# Patient Record
Sex: FEMALE | Race: WHITE | Employment: UNEMPLOYED | URBAN - METROPOLITAN AREA
[De-identification: names, ages, dates, MRNs, and addresses within clinical notes are randomized per-mention and may not be internally consistent; named-entity substitution may affect disease eponyms.]

---

## 2017-12-20 ENCOUNTER — HOSPITAL ENCOUNTER (OUTPATIENT)
Dept: GENERAL RADIOLOGY | Age: 42
Discharge: HOME | End: 2017-12-20
Attending: INTERNAL MEDICINE | Admitting: INTERNAL MEDICINE

## 2017-12-20 DIAGNOSIS — J20.9 ACUTE BRONCHITIS, UNSPECIFIED ORGANISM: ICD-10-CM

## 2017-12-20 LAB
ALBUMIN SERPL-MCNC: 4.2 GM/DL (ref 3.4–5)
ALP BLD-CCNC: 138 IU/L (ref 40–128)
ALT SERPL-CCNC: 64 U/L (ref 10–40)
ANION GAP SERPL CALCULATED.3IONS-SCNC: 12 MMOL/L (ref 4–16)
AST SERPL-CCNC: 85 IU/L (ref 15–37)
BASOPHILS ABSOLUTE: 0.1 K/CU MM
BASOPHILS RELATIVE PERCENT: 0.6 % (ref 0–1)
BILIRUB SERPL-MCNC: 0.3 MG/DL (ref 0–1)
BUN BLDV-MCNC: 7 MG/DL (ref 6–23)
CALCIUM SERPL-MCNC: 9.9 MG/DL (ref 8.3–10.6)
CHLORIDE BLD-SCNC: 101 MMOL/L (ref 99–110)
CHOLESTEROL: 218 MG/DL
CO2: 25 MMOL/L (ref 21–32)
CREAT SERPL-MCNC: 0.7 MG/DL (ref 0.6–1.1)
DIFFERENTIAL TYPE: ABNORMAL
EOSINOPHILS ABSOLUTE: 0.2 K/CU MM
EOSINOPHILS RELATIVE PERCENT: 2.1 % (ref 0–3)
ESTIMATED AVERAGE GLUCOSE: 126 MG/DL
GFR AFRICAN AMERICAN: >60 ML/MIN/1.73M2
GFR NON-AFRICAN AMERICAN: >60 ML/MIN/1.73M2
GLUCOSE FASTING: 98 MG/DL (ref 70–99)
HBA1C MFR BLD: 6 % (ref 4.2–6.3)
HCT VFR BLD CALC: 42.2 % (ref 37–47)
HDLC SERPL-MCNC: 34 MG/DL
HEMOGLOBIN: 14.5 GM/DL (ref 12.5–16)
IMMATURE NEUTROPHIL %: 0.4 % (ref 0–0.43)
LDL CHOLESTEROL DIRECT: 157 MG/DL
LYMPHOCYTES ABSOLUTE: 2.5 K/CU MM
LYMPHOCYTES RELATIVE PERCENT: 24.5 % (ref 24–44)
MCH RBC QN AUTO: 32.8 PG (ref 27–31)
MCHC RBC AUTO-ENTMCNC: 34.4 % (ref 32–36)
MCV RBC AUTO: 95.5 FL (ref 78–100)
MONOCYTES ABSOLUTE: 0.7 K/CU MM
MONOCYTES RELATIVE PERCENT: 7 % (ref 0–4)
NUCLEATED RBC %: 0 %
PDW BLD-RTO: 12.7 % (ref 11.7–14.9)
PLATELET # BLD: 265 K/CU MM (ref 140–440)
PMV BLD AUTO: 11.1 FL (ref 7.5–11.1)
POTASSIUM SERPL-SCNC: 4.3 MMOL/L (ref 3.5–5.1)
RBC # BLD: 4.42 M/CU MM (ref 4.2–5.4)
SEGMENTED NEUTROPHILS ABSOLUTE COUNT: 6.6 K/CU MM
SEGMENTED NEUTROPHILS RELATIVE PERCENT: 65.4 % (ref 36–66)
SODIUM BLD-SCNC: 138 MMOL/L (ref 135–145)
T4 FREE: 1.01 NG/DL (ref 0.9–1.8)
TOTAL IMMATURE NEUTOROPHIL: 0.04 K/CU MM
TOTAL NUCLEATED RBC: 0 K/CU MM
TOTAL PROTEIN: 7.3 GM/DL (ref 6.4–8.2)
TRIGL SERPL-MCNC: 234 MG/DL
TSH HIGH SENSITIVITY: 2.1 UIU/ML (ref 0.27–4.2)
WBC # BLD: 10 K/CU MM (ref 4–10.5)

## 2019-10-31 ENCOUNTER — HOSPITAL ENCOUNTER (EMERGENCY)
Age: 44
Discharge: HOME | End: 2019-10-31
Attending: EMERGENCY MEDICINE
Payer: COMMERCIAL

## 2019-10-31 ENCOUNTER — APPOINTMENT (OUTPATIENT)
Dept: CT IMAGING | Age: 44
End: 2019-10-31
Payer: COMMERCIAL

## 2019-10-31 ENCOUNTER — APPOINTMENT (OUTPATIENT)
Dept: GENERAL RADIOLOGY | Age: 44
End: 2019-10-31
Payer: COMMERCIAL

## 2019-10-31 VITALS
DIASTOLIC BLOOD PRESSURE: 93 MMHG | OXYGEN SATURATION: 97 % | SYSTOLIC BLOOD PRESSURE: 172 MMHG | RESPIRATION RATE: 20 BRPM | HEIGHT: 67 IN | BODY MASS INDEX: 38.45 KG/M2 | WEIGHT: 245 LBS | HEART RATE: 91 BPM | TEMPERATURE: 98.2 F

## 2019-10-31 DIAGNOSIS — M54.6 ACUTE BILATERAL THORACIC BACK PAIN: Primary | ICD-10-CM

## 2019-10-31 LAB
ALBUMIN SERPL-MCNC: 4.1 GM/DL (ref 3.4–5)
ALP BLD-CCNC: 181 IU/L (ref 40–129)
ALT SERPL-CCNC: 60 U/L (ref 10–40)
ANION GAP SERPL CALCULATED.3IONS-SCNC: 11 MMOL/L (ref 4–16)
AST SERPL-CCNC: 113 IU/L (ref 15–37)
BACTERIA: NEGATIVE /HPF
BASOPHILS ABSOLUTE: 0.1 K/CU MM
BASOPHILS RELATIVE PERCENT: 0.5 % (ref 0–1)
BILIRUB SERPL-MCNC: 0.3 MG/DL (ref 0–1)
BILIRUBIN URINE: NEGATIVE MG/DL
BLOOD, URINE: ABNORMAL
BUN BLDV-MCNC: 9 MG/DL (ref 6–23)
CALCIUM OXALATE CRYSTALS: ABNORMAL /HPF
CALCIUM SERPL-MCNC: 10.4 MG/DL (ref 8.3–10.6)
CHLORIDE BLD-SCNC: 102 MMOL/L (ref 99–110)
CLARITY: ABNORMAL
CO2: 24 MMOL/L (ref 21–32)
COLOR: YELLOW
CREAT SERPL-MCNC: 0.7 MG/DL (ref 0.6–1.1)
D DIMER: 213 NG/ML(DDU)
DIFFERENTIAL TYPE: ABNORMAL
EOSINOPHILS ABSOLUTE: 0.2 K/CU MM
EOSINOPHILS RELATIVE PERCENT: 1.9 % (ref 0–3)
GFR AFRICAN AMERICAN: >60 ML/MIN/1.73M2
GFR NON-AFRICAN AMERICAN: >60 ML/MIN/1.73M2
GLUCOSE BLD-MCNC: 117 MG/DL (ref 70–99)
GLUCOSE, URINE: NEGATIVE MG/DL
HCG QUALITATIVE: NEGATIVE
HCT VFR BLD CALC: 42.3 % (ref 37–47)
HEMOGLOBIN: 13.7 GM/DL (ref 12.5–16)
IMMATURE NEUTROPHIL %: 0.5 % (ref 0–0.43)
KETONES, URINE: NEGATIVE MG/DL
LEUKOCYTE ESTERASE, URINE: ABNORMAL
LIPASE: 45 IU/L (ref 13–60)
LYMPHOCYTES ABSOLUTE: 2.8 K/CU MM
LYMPHOCYTES RELATIVE PERCENT: 22.2 % (ref 24–44)
MCH RBC QN AUTO: 31.2 PG (ref 27–31)
MCHC RBC AUTO-ENTMCNC: 32.4 % (ref 32–36)
MCV RBC AUTO: 96.4 FL (ref 78–100)
MONOCYTES ABSOLUTE: 0.8 K/CU MM
MONOCYTES RELATIVE PERCENT: 6.1 % (ref 0–4)
MUCUS: ABNORMAL HPF
NITRITE URINE, QUANTITATIVE: NEGATIVE
NUCLEATED RBC %: 0 %
PDW BLD-RTO: 12.7 % (ref 11.7–14.9)
PH, URINE: 5 (ref 5–8)
PLATELET # BLD: 327 K/CU MM (ref 140–440)
PMV BLD AUTO: 11.2 FL (ref 7.5–11.1)
POTASSIUM SERPL-SCNC: 3.7 MMOL/L (ref 3.5–5.1)
PROTEIN UA: 100 MG/DL
RBC # BLD: 4.39 M/CU MM (ref 4.2–5.4)
RBC URINE: 126 /HPF (ref 0–6)
SEGMENTED NEUTROPHILS ABSOLUTE COUNT: 8.7 K/CU MM
SEGMENTED NEUTROPHILS RELATIVE PERCENT: 68.8 % (ref 36–66)
SODIUM BLD-SCNC: 137 MMOL/L (ref 135–145)
SPECIFIC GRAVITY UA: 1.02 (ref 1–1.03)
SQUAMOUS EPITHELIAL: 2 /HPF
TOTAL IMMATURE NEUTOROPHIL: 0.06 K/CU MM
TOTAL NUCLEATED RBC: 0 K/CU MM
TOTAL PROTEIN: 8 GM/DL (ref 6.4–8.2)
TRICHOMONAS: ABNORMAL /HPF
UROBILINOGEN, URINE: NORMAL MG/DL (ref 0.2–1)
WBC # BLD: 12.6 K/CU MM (ref 4–10.5)
WBC UA: 27 /HPF (ref 0–5)

## 2019-10-31 PROCEDURE — 81001 URINALYSIS AUTO W/SCOPE: CPT

## 2019-10-31 PROCEDURE — 6370000000 HC RX 637 (ALT 250 FOR IP): Performed by: EMERGENCY MEDICINE

## 2019-10-31 PROCEDURE — 87086 URINE CULTURE/COLONY COUNT: CPT

## 2019-10-31 PROCEDURE — 80053 COMPREHEN METABOLIC PANEL: CPT

## 2019-10-31 PROCEDURE — 84703 CHORIONIC GONADOTROPIN ASSAY: CPT

## 2019-10-31 PROCEDURE — 74176 CT ABD & PELVIS W/O CONTRAST: CPT

## 2019-10-31 PROCEDURE — 85379 FIBRIN DEGRADATION QUANT: CPT

## 2019-10-31 PROCEDURE — 85025 COMPLETE CBC W/AUTO DIFF WBC: CPT

## 2019-10-31 PROCEDURE — 83690 ASSAY OF LIPASE: CPT

## 2019-10-31 PROCEDURE — 99284 EMERGENCY DEPT VISIT MOD MDM: CPT

## 2019-10-31 PROCEDURE — 93010 ELECTROCARDIOGRAM REPORT: CPT | Performed by: INTERNAL MEDICINE

## 2019-10-31 PROCEDURE — 93005 ELECTROCARDIOGRAM TRACING: CPT | Performed by: EMERGENCY MEDICINE

## 2019-10-31 PROCEDURE — 71046 X-RAY EXAM CHEST 2 VIEWS: CPT

## 2019-10-31 RX ORDER — LIDOCAINE 4 G/G
2 PATCH TOPICAL DAILY
Status: DISCONTINUED | OUTPATIENT
Start: 2019-10-31 | End: 2019-10-31 | Stop reason: HOSPADM

## 2019-10-31 RX ORDER — LIDOCAINE 50 MG/G
1 PATCH TOPICAL DAILY
Qty: 30 PATCH | Refills: 0 | Status: SHIPPED | OUTPATIENT
Start: 2019-10-31 | End: 2021-02-12

## 2019-10-31 RX ORDER — HYDROCODONE BITARTRATE AND ACETAMINOPHEN 5; 325 MG/1; MG/1
1 TABLET ORAL ONCE
Status: COMPLETED | OUTPATIENT
Start: 2019-10-31 | End: 2019-10-31

## 2019-10-31 RX ADMIN — HYDROCODONE BITARTRATE AND ACETAMINOPHEN 1 TABLET: 5; 325 TABLET ORAL at 09:46

## 2019-10-31 ASSESSMENT — PAIN SCALES - GENERAL
PAINLEVEL_OUTOF10: 10
PAINLEVEL_OUTOF10: 10

## 2019-10-31 ASSESSMENT — PAIN DESCRIPTION - LOCATION: LOCATION: BACK

## 2019-10-31 ASSESSMENT — PAIN DESCRIPTION - PAIN TYPE: TYPE: ACUTE PAIN

## 2019-10-31 ASSESSMENT — PAIN DESCRIPTION - ORIENTATION: ORIENTATION: MID

## 2019-11-01 LAB
CULTURE: ABNORMAL
Lab: ABNORMAL
SPECIMEN: ABNORMAL
TOTAL COLONY COUNT: ABNORMAL

## 2019-11-07 LAB
EKG ATRIAL RATE: 85 BPM
EKG DIAGNOSIS: NORMAL
EKG P AXIS: 42 DEGREES
EKG P-R INTERVAL: 160 MS
EKG Q-T INTERVAL: 392 MS
EKG QRS DURATION: 90 MS
EKG QTC CALCULATION (BAZETT): 466 MS
EKG R AXIS: 16 DEGREES
EKG T AXIS: 53 DEGREES
EKG VENTRICULAR RATE: 85 BPM

## 2019-12-14 ENCOUNTER — HOSPITAL ENCOUNTER (EMERGENCY)
Age: 44
Discharge: HOME | End: 2019-12-14
Attending: EMERGENCY MEDICINE
Payer: COMMERCIAL

## 2019-12-14 ENCOUNTER — APPOINTMENT (OUTPATIENT)
Dept: GENERAL RADIOLOGY | Age: 44
End: 2019-12-14
Payer: COMMERCIAL

## 2019-12-14 VITALS
WEIGHT: 250 LBS | SYSTOLIC BLOOD PRESSURE: 160 MMHG | DIASTOLIC BLOOD PRESSURE: 71 MMHG | BODY MASS INDEX: 37.03 KG/M2 | HEIGHT: 69 IN | TEMPERATURE: 97.7 F | OXYGEN SATURATION: 96 % | RESPIRATION RATE: 16 BRPM | HEART RATE: 89 BPM

## 2019-12-14 DIAGNOSIS — R00.2 PALPITATIONS: ICD-10-CM

## 2019-12-14 DIAGNOSIS — H66.002 ACUTE SUPPURATIVE OTITIS MEDIA OF LEFT EAR WITHOUT SPONTANEOUS RUPTURE OF TYMPANIC MEMBRANE, RECURRENCE NOT SPECIFIED: ICD-10-CM

## 2019-12-14 DIAGNOSIS — J02.9 ACUTE PHARYNGITIS, UNSPECIFIED ETIOLOGY: ICD-10-CM

## 2019-12-14 DIAGNOSIS — R07.9 CHEST PAIN, UNSPECIFIED TYPE: ICD-10-CM

## 2019-12-14 DIAGNOSIS — J06.9 UPPER RESPIRATORY TRACT INFECTION, UNSPECIFIED TYPE: Primary | ICD-10-CM

## 2019-12-14 LAB
ALBUMIN SERPL-MCNC: 3.9 GM/DL (ref 3.4–5)
ALP BLD-CCNC: 184 IU/L (ref 40–129)
ALT SERPL-CCNC: 51 U/L (ref 10–40)
ANION GAP SERPL CALCULATED.3IONS-SCNC: 14 MMOL/L (ref 4–16)
AST SERPL-CCNC: 99 IU/L (ref 15–37)
BASOPHILS ABSOLUTE: 0.1 K/CU MM
BASOPHILS RELATIVE PERCENT: 0.5 % (ref 0–1)
BILIRUB SERPL-MCNC: 0.4 MG/DL (ref 0–1)
BUN BLDV-MCNC: 8 MG/DL (ref 6–23)
CALCIUM SERPL-MCNC: 10.4 MG/DL (ref 8.3–10.6)
CHLORIDE BLD-SCNC: 102 MMOL/L (ref 99–110)
CO2: 21 MMOL/L (ref 21–32)
CREAT SERPL-MCNC: 0.6 MG/DL (ref 0.6–1.1)
DIFFERENTIAL TYPE: ABNORMAL
EOSINOPHILS ABSOLUTE: 0.3 K/CU MM
EOSINOPHILS RELATIVE PERCENT: 2.3 % (ref 0–3)
GFR AFRICAN AMERICAN: >60 ML/MIN/1.73M2
GFR NON-AFRICAN AMERICAN: >60 ML/MIN/1.73M2
GLUCOSE BLD-MCNC: 217 MG/DL (ref 70–99)
HCT VFR BLD CALC: 45.6 % (ref 37–47)
HEMOGLOBIN: 14.4 GM/DL (ref 12.5–16)
IMMATURE NEUTROPHIL %: 0.5 % (ref 0–0.43)
LYMPHOCYTES ABSOLUTE: 2.7 K/CU MM
LYMPHOCYTES RELATIVE PERCENT: 23 % (ref 24–44)
MCH RBC QN AUTO: 30.9 PG (ref 27–31)
MCHC RBC AUTO-ENTMCNC: 31.6 % (ref 32–36)
MCV RBC AUTO: 97.9 FL (ref 78–100)
MONOCYTES ABSOLUTE: 0.6 K/CU MM
MONOCYTES RELATIVE PERCENT: 5.1 % (ref 0–4)
NUCLEATED RBC %: 0 %
PDW BLD-RTO: 13.1 % (ref 11.7–14.9)
PLATELET # BLD: 305 K/CU MM (ref 140–440)
PMV BLD AUTO: 10.9 FL (ref 7.5–11.1)
POTASSIUM SERPL-SCNC: 3.8 MMOL/L (ref 3.5–5.1)
PRO-BNP: 97.09 PG/ML
RBC # BLD: 4.66 M/CU MM (ref 4.2–5.4)
SEGMENTED NEUTROPHILS ABSOLUTE COUNT: 8.2 K/CU MM
SEGMENTED NEUTROPHILS RELATIVE PERCENT: 68.6 % (ref 36–66)
SODIUM BLD-SCNC: 137 MMOL/L (ref 135–145)
TOTAL IMMATURE NEUTOROPHIL: 0.06 K/CU MM
TOTAL NUCLEATED RBC: 0 K/CU MM
TOTAL PROTEIN: 8.1 GM/DL (ref 6.4–8.2)
TROPONIN T: <0.01 NG/ML
WBC # BLD: 11.9 K/CU MM (ref 4–10.5)

## 2019-12-14 PROCEDURE — 83880 ASSAY OF NATRIURETIC PEPTIDE: CPT

## 2019-12-14 PROCEDURE — 85025 COMPLETE CBC W/AUTO DIFF WBC: CPT

## 2019-12-14 PROCEDURE — 80053 COMPREHEN METABOLIC PANEL: CPT

## 2019-12-14 PROCEDURE — 36415 COLL VENOUS BLD VENIPUNCTURE: CPT

## 2019-12-14 PROCEDURE — 84484 ASSAY OF TROPONIN QUANT: CPT

## 2019-12-14 PROCEDURE — 71045 X-RAY EXAM CHEST 1 VIEW: CPT

## 2019-12-14 PROCEDURE — 99285 EMERGENCY DEPT VISIT HI MDM: CPT

## 2019-12-14 PROCEDURE — 93005 ELECTROCARDIOGRAM TRACING: CPT | Performed by: EMERGENCY MEDICINE

## 2019-12-14 RX ORDER — ALBUTEROL SULFATE 90 UG/1
2 AEROSOL, METERED RESPIRATORY (INHALATION) EVERY 4 HOURS PRN
Qty: 1 INHALER | Refills: 1 | Status: SHIPPED | OUTPATIENT
Start: 2019-12-14 | End: 2020-12-23

## 2019-12-14 RX ORDER — GUAIFENESIN/DEXTROMETHORPHAN 100-10MG/5
5 SYRUP ORAL 3 TIMES DAILY PRN
Qty: 120 ML | Refills: 0 | Status: SHIPPED | OUTPATIENT
Start: 2019-12-14 | End: 2019-12-24

## 2019-12-14 RX ORDER — AZITHROMYCIN 250 MG/1
TABLET, FILM COATED ORAL
Qty: 1 PACKET | Refills: 0 | Status: SHIPPED | OUTPATIENT
Start: 2019-12-14 | End: 2019-12-18

## 2019-12-14 RX ORDER — BENZONATATE 100 MG/1
100 CAPSULE ORAL 3 TIMES DAILY PRN
Qty: 30 CAPSULE | Refills: 0 | Status: SHIPPED | OUTPATIENT
Start: 2019-12-14 | End: 2019-12-24

## 2019-12-15 PROCEDURE — 93010 ELECTROCARDIOGRAM REPORT: CPT | Performed by: INTERNAL MEDICINE

## 2019-12-19 LAB
EKG ATRIAL RATE: 90 BPM
EKG DIAGNOSIS: NORMAL
EKG P AXIS: 55 DEGREES
EKG P-R INTERVAL: 206 MS
EKG Q-T INTERVAL: 368 MS
EKG QRS DURATION: 96 MS
EKG QTC CALCULATION (BAZETT): 450 MS
EKG R AXIS: 30 DEGREES
EKG T AXIS: 58 DEGREES
EKG VENTRICULAR RATE: 90 BPM

## 2020-03-21 ENCOUNTER — HOSPITAL ENCOUNTER (EMERGENCY)
Age: 45
Discharge: HOME | End: 2020-03-21
Attending: EMERGENCY MEDICINE
Payer: COMMERCIAL

## 2020-03-21 VITALS
DIASTOLIC BLOOD PRESSURE: 109 MMHG | OXYGEN SATURATION: 96 % | BODY MASS INDEX: 36.29 KG/M2 | TEMPERATURE: 98.2 F | HEART RATE: 100 BPM | SYSTOLIC BLOOD PRESSURE: 190 MMHG | HEIGHT: 69 IN | WEIGHT: 245 LBS

## 2020-03-21 PROCEDURE — 4500000027

## 2020-03-21 PROCEDURE — 2500000003 HC RX 250 WO HCPCS: Performed by: EMERGENCY MEDICINE

## 2020-03-21 PROCEDURE — 99282 EMERGENCY DEPT VISIT SF MDM: CPT

## 2020-03-21 PROCEDURE — 6370000000 HC RX 637 (ALT 250 FOR IP): Performed by: EMERGENCY MEDICINE

## 2020-03-21 RX ORDER — POLYETHYLENE GLYCOL 3350 17 G/17G
17 POWDER, FOR SOLUTION ORAL DAILY PRN
Qty: 527 G | Refills: 1 | Status: SHIPPED | OUTPATIENT
Start: 2020-03-21 | End: 2020-04-20

## 2020-03-21 RX ORDER — ONDANSETRON 4 MG/1
4 TABLET, ORALLY DISINTEGRATING ORAL EVERY 8 HOURS PRN
Qty: 15 TABLET | Refills: 0 | Status: SHIPPED | OUTPATIENT
Start: 2020-03-21 | End: 2020-09-12 | Stop reason: ALTCHOICE

## 2020-03-21 RX ORDER — NAPROXEN 500 MG/1
500 TABLET ORAL 2 TIMES DAILY
Qty: 60 TABLET | Refills: 0 | OUTPATIENT
Start: 2020-03-21 | End: 2020-12-23

## 2020-03-21 RX ORDER — AMOXICILLIN AND CLAVULANATE POTASSIUM 875; 125 MG/1; MG/1
1 TABLET, FILM COATED ORAL ONCE
Status: COMPLETED | OUTPATIENT
Start: 2020-03-21 | End: 2020-03-21

## 2020-03-21 RX ORDER — NAPROXEN 250 MG/1
500 TABLET ORAL ONCE
Status: COMPLETED | OUTPATIENT
Start: 2020-03-21 | End: 2020-03-21

## 2020-03-21 RX ORDER — LIDOCAINE HYDROCHLORIDE 10 MG/ML
10 INJECTION, SOLUTION INFILTRATION; PERINEURAL ONCE
Status: COMPLETED | OUTPATIENT
Start: 2020-03-21 | End: 2020-03-21

## 2020-03-21 RX ORDER — HYDROCODONE BITARTRATE AND ACETAMINOPHEN 5; 325 MG/1; MG/1
1 TABLET ORAL ONCE
Status: COMPLETED | OUTPATIENT
Start: 2020-03-21 | End: 2020-03-21

## 2020-03-21 RX ORDER — AMOXICILLIN AND CLAVULANATE POTASSIUM 875; 125 MG/1; MG/1
1 TABLET, FILM COATED ORAL 2 TIMES DAILY
Qty: 20 TABLET | Refills: 0 | Status: SHIPPED | OUTPATIENT
Start: 2020-03-21 | End: 2020-03-31

## 2020-03-21 RX ORDER — ONDANSETRON 4 MG/1
4 TABLET, ORALLY DISINTEGRATING ORAL ONCE
Status: COMPLETED | OUTPATIENT
Start: 2020-03-21 | End: 2020-03-21

## 2020-03-21 RX ORDER — HYDROCODONE BITARTRATE AND ACETAMINOPHEN 5; 325 MG/1; MG/1
1-2 TABLET ORAL EVERY 6 HOURS PRN
Qty: 10 TABLET | Refills: 0 | Status: SHIPPED | OUTPATIENT
Start: 2020-03-21 | End: 2020-03-23

## 2020-03-21 RX ADMIN — LIDOCAINE HYDROCHLORIDE 10 ML: 10 INJECTION, SOLUTION INFILTRATION; PERINEURAL at 10:30

## 2020-03-21 RX ADMIN — NAPROXEN 500 MG: 250 TABLET ORAL at 09:57

## 2020-03-21 RX ADMIN — AMOXICILLIN AND CLAVULANATE POTASSIUM 1 TABLET: 875; 125 TABLET, FILM COATED ORAL at 09:57

## 2020-03-21 RX ADMIN — HYDROCODONE BITARTRATE AND ACETAMINOPHEN 1 TABLET: 5; 325 TABLET ORAL at 09:58

## 2020-03-21 RX ADMIN — ONDANSETRON 4 MG: 4 TABLET, ORALLY DISINTEGRATING ORAL at 09:57

## 2020-03-21 ASSESSMENT — PAIN DESCRIPTION - PROGRESSION: CLINICAL_PROGRESSION: NOT CHANGED

## 2020-03-21 ASSESSMENT — PAIN DESCRIPTION - ONSET: ONSET: GRADUAL

## 2020-03-21 ASSESSMENT — PAIN SCALES - GENERAL
PAINLEVEL_OUTOF10: 5
PAINLEVEL_OUTOF10: 10

## 2020-03-21 ASSESSMENT — PAIN DESCRIPTION - LOCATION: LOCATION: TEETH

## 2020-03-21 ASSESSMENT — PAIN DESCRIPTION - FREQUENCY: FREQUENCY: CONTINUOUS

## 2020-03-21 ASSESSMENT — PAIN DESCRIPTION - PAIN TYPE: TYPE: ACUTE PAIN;CHRONIC PAIN

## 2020-03-21 NOTE — ED PROVIDER NOTES
Triage Chief Complaint:   Dental Pain    [place]:  Susanna Wise is a 40 y.o. female that presents with complaint of right-sided dental pain. Patient states pain started last night she has been taking Tylenol no relief. Denies any headache fevers nausea vomiting chest pain shortness of breath. Has not seen a dentist due to current conditions and then being shut down. Denies other questions or concerns feel like there is some swelling there.     ROS:  General:  No fevers  Eyes:  no discharge  ENT:  No sore throat, no nasal congestion, no hearing changes, + dental pain  Cardiovascular:  No chest pain  Respiratory:  no cough  Gastrointestinal:  no nausea, no vomiting  Musculoskeletal:   no joint pain  Skin:  No rash  Neurologic:  No speech problems, no headache  Genitourinary:  No dysuria    Past Medical History:   Diagnosis Date    Asthma     last flare up 11/2016    Migraine     Last migraine 12/2015    Ovarian cyst     Rt side    Wears glasses      Past Surgical History:   Procedure Laterality Date   [address] [facility], 2000, 2006( with BPS)    x 5    HERNIA REPAIR      OTHER SURGICAL HISTORY  12 29 2015    robotic assist lap ventral hernia repair with mesh graft    UMBILICAL HERNIA REPAIR  03/31/15    laparoscopic with mesh    UMBILICAL HERNIA REPAIR  01/23/2017    Robotic    VENTRAL HERNIA REPAIR  01/23/2017    Robotic    WISDOM TOOTH EXTRACTION      3 of 4 extracted     Family History   Problem Relation Age of Onset    Diabetes Mother     Other Mother         thyroid    High Blood Pressure Mother     COPD Mother     Kidney Disease Mother     High Blood Pressure Sister     Emphysema Sister     High Blood Pressure Sister     Early Death Son     Hearing Loss Daughter      Social History     Socioeconomic History    Marital status: Legally      Spouse name: Not on file    Number of children: Not on file    Years of education: Not on file    Highest education level: DO [place]  03/21/20 1050

## 2020-09-12 ENCOUNTER — APPOINTMENT (OUTPATIENT)
Dept: CT IMAGING | Age: 45
End: 2020-09-12
Payer: COMMERCIAL

## 2020-09-12 ENCOUNTER — HOSPITAL ENCOUNTER (EMERGENCY)
Age: 45
Discharge: HOME | End: 2020-09-12
Attending: EMERGENCY MEDICINE
Payer: COMMERCIAL

## 2020-09-12 VITALS
WEIGHT: 243 LBS | TEMPERATURE: 98.1 F | SYSTOLIC BLOOD PRESSURE: 161 MMHG | BODY MASS INDEX: 35.99 KG/M2 | HEIGHT: 69 IN | HEART RATE: 78 BPM | OXYGEN SATURATION: 97 % | RESPIRATION RATE: 17 BRPM | DIASTOLIC BLOOD PRESSURE: 96 MMHG

## 2020-09-12 LAB
ALBUMIN SERPL-MCNC: 4.3 GM/DL (ref 3.4–5)
ALP BLD-CCNC: 160 IU/L (ref 40–129)
ALT SERPL-CCNC: 21 U/L (ref 10–40)
AMORPHOUS: ABNORMAL /HPF
ANION GAP SERPL CALCULATED.3IONS-SCNC: 12 MMOL/L (ref 4–16)
AST SERPL-CCNC: 23 IU/L (ref 15–37)
BACTERIA: NEGATIVE /HPF
BASOPHILS ABSOLUTE: 0.1 K/CU MM
BASOPHILS RELATIVE PERCENT: 0.5 % (ref 0–1)
BILIRUB SERPL-MCNC: 0.4 MG/DL (ref 0–1)
BILIRUBIN URINE: NEGATIVE MG/DL
BLOOD, URINE: NEGATIVE
BUN BLDV-MCNC: 11 MG/DL (ref 6–23)
CALCIUM SERPL-MCNC: 10.3 MG/DL (ref 8.3–10.6)
CHLORIDE BLD-SCNC: 97 MMOL/L (ref 99–110)
CLARITY: ABNORMAL
CO2: 23 MMOL/L (ref 21–32)
COLOR: YELLOW
CREAT SERPL-MCNC: 0.9 MG/DL (ref 0.6–1.1)
DIFFERENTIAL TYPE: ABNORMAL
EOSINOPHILS ABSOLUTE: 0.1 K/CU MM
EOSINOPHILS RELATIVE PERCENT: 0.8 % (ref 0–3)
GFR AFRICAN AMERICAN: >60 ML/MIN/1.73M2
GFR NON-AFRICAN AMERICAN: >60 ML/MIN/1.73M2
GLUCOSE BLD-MCNC: 137 MG/DL (ref 70–99)
GLUCOSE, URINE: NEGATIVE MG/DL
HCG QUALITATIVE: NEGATIVE
HCT VFR BLD CALC: 42.3 % (ref 37–47)
HEMOGLOBIN: 13.7 GM/DL (ref 12.5–16)
IMMATURE NEUTROPHIL %: 0.3 % (ref 0–0.43)
KETONES, URINE: NEGATIVE MG/DL
LEUKOCYTE ESTERASE, URINE: NEGATIVE
LYMPHOCYTES ABSOLUTE: 2.5 K/CU MM
LYMPHOCYTES RELATIVE PERCENT: 18.9 % (ref 24–44)
MCH RBC QN AUTO: 30.6 PG (ref 27–31)
MCHC RBC AUTO-ENTMCNC: 32.4 % (ref 32–36)
MCV RBC AUTO: 94.6 FL (ref 78–100)
MONOCYTES ABSOLUTE: 0.6 K/CU MM
MONOCYTES RELATIVE PERCENT: 4.6 % (ref 0–4)
MUCUS: ABNORMAL HPF
NITRITE URINE, QUANTITATIVE: NEGATIVE
NUCLEATED RBC %: 0 %
PDW BLD-RTO: 12.5 % (ref 11.7–14.9)
PH, URINE: 6 (ref 5–8)
PLATELET # BLD: 291 K/CU MM (ref 140–440)
PMV BLD AUTO: 10.4 FL (ref 7.5–11.1)
POTASSIUM SERPL-SCNC: 4.3 MMOL/L (ref 3.5–5.1)
PROTEIN UA: 30 MG/DL
RBC # BLD: 4.47 M/CU MM (ref 4.2–5.4)
RBC URINE: <1 /HPF (ref 0–6)
SEGMENTED NEUTROPHILS ABSOLUTE COUNT: 9.8 K/CU MM
SEGMENTED NEUTROPHILS RELATIVE PERCENT: 74.9 % (ref 36–66)
SODIUM BLD-SCNC: 132 MMOL/L (ref 135–145)
SPECIFIC GRAVITY UA: 1.02 (ref 1–1.03)
SQUAMOUS EPITHELIAL: 7 /HPF
TOTAL IMMATURE NEUTOROPHIL: 0.04 K/CU MM
TOTAL NUCLEATED RBC: 0 K/CU MM
TOTAL PROTEIN: 7.8 GM/DL (ref 6.4–8.2)
TRICHOMONAS: ABNORMAL /HPF
UROBILINOGEN, URINE: NORMAL MG/DL (ref 0.2–1)
WBC # BLD: 13 K/CU MM (ref 4–10.5)
WBC UA: 1 /HPF (ref 0–5)

## 2020-09-12 PROCEDURE — 36415 COLL VENOUS BLD VENIPUNCTURE: CPT

## 2020-09-12 PROCEDURE — 74176 CT ABD & PELVIS W/O CONTRAST: CPT

## 2020-09-12 PROCEDURE — 96374 THER/PROPH/DIAG INJ IV PUSH: CPT

## 2020-09-12 PROCEDURE — 84703 CHORIONIC GONADOTROPIN ASSAY: CPT

## 2020-09-12 PROCEDURE — 81001 URINALYSIS AUTO W/SCOPE: CPT

## 2020-09-12 PROCEDURE — 2580000003 HC RX 258: Performed by: EMERGENCY MEDICINE

## 2020-09-12 PROCEDURE — 96375 TX/PRO/DX INJ NEW DRUG ADDON: CPT

## 2020-09-12 PROCEDURE — 85025 COMPLETE CBC W/AUTO DIFF WBC: CPT

## 2020-09-12 PROCEDURE — 6360000002 HC RX W HCPCS: Performed by: EMERGENCY MEDICINE

## 2020-09-12 PROCEDURE — 94761 N-INVAS EAR/PLS OXIMETRY MLT: CPT

## 2020-09-12 PROCEDURE — 87086 URINE CULTURE/COLONY COUNT: CPT

## 2020-09-12 PROCEDURE — 99284 EMERGENCY DEPT VISIT MOD MDM: CPT

## 2020-09-12 PROCEDURE — 80053 COMPREHEN METABOLIC PANEL: CPT

## 2020-09-12 RX ORDER — ONDANSETRON 2 MG/ML
4 INJECTION INTRAMUSCULAR; INTRAVENOUS ONCE
Status: COMPLETED | OUTPATIENT
Start: 2020-09-12 | End: 2020-09-12

## 2020-09-12 RX ORDER — SULFAMETHOXAZOLE AND TRIMETHOPRIM 800; 160 MG/1; MG/1
1 TABLET ORAL 2 TIMES DAILY
Qty: 14 TABLET | Refills: 0 | Status: SHIPPED | OUTPATIENT
Start: 2020-09-12 | End: 2020-09-19

## 2020-09-12 RX ORDER — HYDROCODONE BITARTRATE AND ACETAMINOPHEN 5; 325 MG/1; MG/1
1 TABLET ORAL EVERY 6 HOURS PRN
Qty: 15 TABLET | Refills: 0 | Status: SHIPPED | OUTPATIENT
Start: 2020-09-12 | End: 2020-09-17

## 2020-09-12 RX ORDER — ONDANSETRON 4 MG/1
4 TABLET, ORALLY DISINTEGRATING ORAL EVERY 8 HOURS PRN
Qty: 15 TABLET | Refills: 0 | Status: SHIPPED | OUTPATIENT
Start: 2020-09-12 | End: 2020-12-23

## 2020-09-12 RX ORDER — 0.9 % SODIUM CHLORIDE 0.9 %
1000 INTRAVENOUS SOLUTION INTRAVENOUS ONCE
Status: COMPLETED | OUTPATIENT
Start: 2020-09-12 | End: 2020-09-12

## 2020-09-12 RX ORDER — KETOROLAC TROMETHAMINE 30 MG/ML
30 INJECTION, SOLUTION INTRAMUSCULAR; INTRAVENOUS ONCE
Status: COMPLETED | OUTPATIENT
Start: 2020-09-12 | End: 2020-09-12

## 2020-09-12 RX ADMIN — SODIUM CHLORIDE 1000 ML: 9 INJECTION, SOLUTION INTRAVENOUS at 10:07

## 2020-09-12 RX ADMIN — KETOROLAC TROMETHAMINE 30 MG: 30 INJECTION, SOLUTION INTRAMUSCULAR; INTRAVENOUS at 10:07

## 2020-09-12 RX ADMIN — ONDANSETRON 4 MG: 2 INJECTION INTRAMUSCULAR; INTRAVENOUS at 10:07

## 2020-09-12 ASSESSMENT — PAIN SCALES - GENERAL
PAINLEVEL_OUTOF10: 9
PAINLEVEL_OUTOF10: 9

## 2020-09-12 ASSESSMENT — PAIN DESCRIPTION - ORIENTATION: ORIENTATION: LEFT

## 2020-09-12 ASSESSMENT — PAIN DESCRIPTION - PAIN TYPE: TYPE: ACUTE PAIN

## 2020-09-12 ASSESSMENT — PAIN DESCRIPTION - LOCATION: LOCATION: FLANK

## 2020-09-12 NOTE — ED NOTES
The patient presents to the emergency department alert and oriented with a complaint of left flank since last night. The patient denies any diarrhea. The patient was nauseated and not able to sleep last night. The patient placed on the monitor with vital signs taken. Assessment as follows; Skin is pink, warm and dry. Lung sounds are clear. Abdomen is soft and not tender.       Noemi King RN  09/12/20 3481

## 2020-09-12 NOTE — ED PROVIDER NOTES
03/31/15    laparoscopic with mesh    UMBILICAL HERNIA REPAIR  01/23/2017    Robotic    VENTRAL HERNIA REPAIR  01/23/2017    Robotic    WISDOM TOOTH EXTRACTION      3 of 4 extracted       FAMILY HISTORY:   Family History   Problem Relation Age of Onset    Diabetes Mother     Other Mother         thyroid    High Blood Pressure Mother     COPD Mother     Kidney Disease Mother     High Blood Pressure Sister     Emphysema Sister     High Blood Pressure Sister     Early Death Son     Hearing Loss Daughter        SOCIAL HISTORY:   Social History     Socioeconomic History    Marital status: Legally      Spouse name: Not on file    Number of children: Not on file    Years of education: Not on file    Highest education level: Not on file   Occupational History    Not on file   Social Needs    Financial resource strain: Not on file    Food insecurity     Worry: Not on file     Inability: Not on file    Transportation needs     Medical: Not on file     Non-medical: Not on file   Tobacco Use    Smoking status: Current Some Day Smoker     Packs/day: 0.50     Years: 8.00     Pack years: 4.00     Types: Cigarettes    Smokeless tobacco: Never Used   Substance and Sexual Activity    Alcohol use: Yes     Comment: occasional       CAFFEINE: 2 liter soda daily    Drug use: No    Sexual activity: Yes     Partners: Male   Lifestyle    Physical activity     Days per week: Not on file     Minutes per session: Not on file    Stress: Not on file   Relationships    Social connections     Talks on phone: Not on file     Gets together: Not on file     Attends Evangelical service: Not on file     Active member of club or organization: Not on file     Attends meetings of clubs or organizations: Not on file     Relationship status: Not on file    Intimate partner violence     Fear of current or ex partner: Not on file     Emotionally abused: Not on file     Physically abused: Not on file     Forced sexual activity: Not on file   Other Topics Concern    Not on file   Social History Narrative    Not on file       ALLERGIES: Ciprofloxacin    PHYSICAL EXAM:  VITAL SIGNS:   ED Triage Vitals [09/12/20 0920]   Enc Vitals Group      BP (!) 190/104      Pulse 83      Resp 17      Temp 98.1 °F (36.7 °C)      Temp Source Oral      SpO2 98 %      Weight 243 lb (110.2 kg)      Height 5' 9\" (1.753 m)      Head Circumference       Peak Flow       Pain Score       Pain Loc       Pain Edu? Excl. in [address]? Constitutional:  Non-toxic appearance  HENT: Normocephalic, Atraumatic, Bilateral external ears normal, Oropharynx moist, No oral exudates, Nose normal.  Eyes: PERRL, conjunctiva normal   Neck: Normal range of motion, No tenderness, Supple, No stridor, No lymphadenopathy. Cardiovascular:  Normal heart rate, Normal rhythm  Pulmonary/Chest:  Normal breath sounds, No respiratory distress, No wheezing  Abdomen: Bowel sounds normal, Soft, No tenderness, No masses, No pulsatile masses  Back:  Left flank tenderness to palpation, no bruising, no rash  Extremities:  Normal range of motion, Intact distal pulses, No edema, No tenderness  Skin:  Warm, Dry, No erythema, No rash      EKG:    None    Radiology / Procedures:  CT ABDOMEN PELVIS WO CONTRAST Additional Contrast? None (Final result)   Result time 09/12/20 11:24:42   Final result by Ebony Tyler MD (09/12/20 11:24:42)                 Impression:     Severe left hydronephrosis appearing to be due to left ureteropelvic junction   obstruction.  There appears to be left renal parenchymal edema with infection   not excluded. Narrative:     EXAMINATION:   CT OF THE ABDOMEN AND PELVIS WITHOUT CONTRAST     9/12/2020 10:42 am     TECHNIQUE:   CT of the abdomen and pelvis was performed without the administration of   intravenous contrast. Multiplanar reformatted images are provided for review.    Dose modulation, iterative reconstruction, and/or weight based adjustment  Changes of mesh umbilical hernia pair with persistence   of a small wide-mouth umbilical hernia.  Healed midline infraumbilical   laparotomy incision.  Laxity of the anterior and lateral abdominal wall   musculature.  No inguinal lymphadenopathy.  No acute fractures nor suspicious   osseous lesions. Labs Reviewed   CBC WITH AUTO DIFFERENTIAL - Abnormal; Notable for the following components:       Result Value    WBC 13.0 (*)     Segs Relative 74.9 (*)     Lymphocytes % 18.9 (*)     Monocytes % 4.6 (*)     All other components within normal limits   COMPREHENSIVE METABOLIC PANEL - Abnormal; Notable for the following components:    Sodium 132 (*)     Chloride 97 (*)     Glucose 137 (*)     Alkaline Phosphatase 160 (*)     All other components within normal limits   URINALYSIS WITH MICROSCOPIC - Abnormal; Notable for the following components:    Clarity, UA HAZY (*)     Protein, UA 30 (*)     Mucus, UA OCCASIONAL (*)     All other components within normal limits   CULTURE, URINE   HCG, SERUM, QUALITATIVE       ED COURSE & MEDICAL DECISION MAKING:  Pertinent Labs & Imaging studies reviewed. (See chart for details)  On exam, the patient is afebrile and nontoxic appearing. She is hypertensive but is asymptomatic and is instructed to have this rechecked as an outpatient this week. She is otherwise hemodynamically stable and neurologically intact. Labs are obtained and are significant for leukocytosis with no focal infection. There are no other clinically significant lab abnormalities. Urinalysis is negative. CT abdomen and pelvis was obtained and shows severe left-sided hydronephrosis that appears to be due to a left ureteropelvic junction obstruction. There appears to be left renal parenchymal edema with infection not excluded. However, there is no evidence of infection on urinalysis. The patient was treated with Zofran, IV normal saline and Toradol and felt significantly better.  Abdomen was reexamined and is benign. The patient was tolerating oral fluids without difficulty. I suspect that the patient has left flank pain secondary to hydronephrosis with ureteropelvic junction obstruction. It is unclear what is causing this obstruction at this time. There is no evidence of obstructive uropathy. I have a low suspicion for pyelonephritis, acute discitis, neurologic deficit, cauda equina syndrome, acute appendicitis, intraabdominal abscess, perforation, bowel obstruction, AAA, dissection, ischemic bowel, or acute surgical abdomen. I discussed the case with the urologist on-call, Dr. Irma Michel, who felt that the patient could be seen in close follow-up as an outpatient. He recommends treatment with Bactrim as well as pain medication. The patient is in agreement with outpatient management. She is given return precautions. The patient verbalized understanding, was agreeable with plan, and the patient was discharged home in stable condition. Clinical Impression:  1. Left flank pain    2. Hydronephrosis with ureteropelvic junction (UPJ) obstruction        Disposition referral (if applicable):  Luz Marina Davies, [address] [place] [address] [id number] [address] [phone]    Schedule an appointment as soon as possible for a visit in 2 days      [facility] Emergency Department  [address] [phone]  Go to   If symptoms worsen      Disposition medications (if applicable):  Discharge Medication List as of 9/12/2020 12:36 PM      START taking these medications    Details   ondansetron (ZOFRAN ODT) 4 MG disintegrating tablet Take 1 tablet by mouth every 8 hours as needed for Nausea, Disp-15 tablet,R-0Print      HYDROcodone-acetaminophen (NORCO) 5-325 MG per tablet Take 1 tablet by mouth every 6 hours as needed for Pain for up to 5 days. Intended supply: 3 days.  Take lowest dose possible to manage pain, Disp-15 tablet,R-0Print sulfamethoxazole-trimethoprim (BACTRIM DS) 800-160 MG per tablet Take 1 tablet by mouth 2 times daily for 7 days, Disp-14 tablet,R-0Print               Comment: Please note this report has been produced using speech recognition software and may contain errors related to that system including errors in grammar, punctuation, and spelling, as well as words and phrases that may be inappropriate. If there are any questions or concerns please feel free to contact the dictating provider for clarification.         Ney Finch MD  09/15/20 4869

## 2020-09-12 NOTE — ED NOTES
Upon evaluation of the client, they are observed to be alert and oriented to person, place and situation. The head of the bed is elevated above 30 degrees. The client verbalizes appropriately to all questions and/or comments. The client also makes eye contact when prompted. The client also exhibits unlabored breathing, their skin is pink, warm & dry, and are observed to have relaxed extremities. When communicating, the client speaks with clear speech and normal tone and is in no apparent distress. The call light is within reach, the bed is in the low position and both side rails are up.      Erlinda Strong RN  09/12/20 6681

## 2020-09-13 LAB
CULTURE: NORMAL
Lab: NORMAL
SPECIMEN: NORMAL

## 2020-12-23 ENCOUNTER — APPOINTMENT (OUTPATIENT)
Dept: CT IMAGING | Age: 45
End: 2020-12-23
Payer: COMMERCIAL

## 2020-12-23 ENCOUNTER — HOSPITAL ENCOUNTER (EMERGENCY)
Age: 45
Discharge: HOME | End: 2020-12-23
Payer: COMMERCIAL

## 2020-12-23 VITALS
HEART RATE: 77 BPM | RESPIRATION RATE: 22 BRPM | DIASTOLIC BLOOD PRESSURE: 106 MMHG | BODY MASS INDEX: 35.55 KG/M2 | SYSTOLIC BLOOD PRESSURE: 173 MMHG | HEIGHT: 69 IN | TEMPERATURE: 97.7 F | WEIGHT: 240 LBS | OXYGEN SATURATION: 95 %

## 2020-12-23 LAB
ALBUMIN SERPL-MCNC: 4.1 GM/DL (ref 3.4–5)
ALP BLD-CCNC: 156 IU/L (ref 40–129)
ALT SERPL-CCNC: 14 U/L (ref 10–40)
ANION GAP SERPL CALCULATED.3IONS-SCNC: 11 MMOL/L (ref 4–16)
AST SERPL-CCNC: 18 IU/L (ref 15–37)
BACTERIA: NEGATIVE /HPF
BASOPHILS ABSOLUTE: 0 K/CU MM
BASOPHILS RELATIVE PERCENT: 0.3 % (ref 0–1)
BILIRUB SERPL-MCNC: 0.4 MG/DL (ref 0–1)
BILIRUBIN URINE: NEGATIVE MG/DL
BLOOD, URINE: NEGATIVE
BUN BLDV-MCNC: 8 MG/DL (ref 6–23)
CALCIUM SERPL-MCNC: 10 MG/DL (ref 8.3–10.6)
CHLORIDE BLD-SCNC: 101 MMOL/L (ref 99–110)
CLARITY: ABNORMAL
CO2: 21 MMOL/L (ref 21–32)
COLOR: YELLOW
CREAT SERPL-MCNC: 0.8 MG/DL (ref 0.6–1.1)
DIFFERENTIAL TYPE: ABNORMAL
EOSINOPHILS ABSOLUTE: 0.1 K/CU MM
EOSINOPHILS RELATIVE PERCENT: 0.8 % (ref 0–3)
GFR AFRICAN AMERICAN: >60 ML/MIN/1.73M2
GFR NON-AFRICAN AMERICAN: >60 ML/MIN/1.73M2
GLUCOSE BLD-MCNC: 115 MG/DL (ref 70–99)
GLUCOSE, URINE: NEGATIVE MG/DL
HCT VFR BLD CALC: 43.7 % (ref 37–47)
HEMOGLOBIN: 14.6 GM/DL (ref 12.5–16)
IMMATURE NEUTROPHIL %: 0.5 % (ref 0–0.43)
KETONES, URINE: NEGATIVE MG/DL
LEUKOCYTE ESTERASE, URINE: NEGATIVE
LIPASE: 26 IU/L (ref 13–60)
LYMPHOCYTES ABSOLUTE: 1.9 K/CU MM
LYMPHOCYTES RELATIVE PERCENT: 15.9 % (ref 24–44)
MCH RBC QN AUTO: 31.1 PG (ref 27–31)
MCHC RBC AUTO-ENTMCNC: 33.4 % (ref 32–36)
MCV RBC AUTO: 93 FL (ref 78–100)
MONOCYTES ABSOLUTE: 0.6 K/CU MM
MONOCYTES RELATIVE PERCENT: 5.5 % (ref 0–4)
MUCUS: ABNORMAL HPF
NITRITE URINE, QUANTITATIVE: NEGATIVE
NUCLEATED RBC %: 0 %
PDW BLD-RTO: 13.2 % (ref 11.7–14.9)
PH, URINE: 6 (ref 5–8)
PLATELET # BLD: 301 K/CU MM (ref 140–440)
PMV BLD AUTO: 10.9 FL (ref 7.5–11.1)
POTASSIUM SERPL-SCNC: 4.1 MMOL/L (ref 3.5–5.1)
PROTEIN UA: 100 MG/DL
RBC # BLD: 4.7 M/CU MM (ref 4.2–5.4)
RBC URINE: 1 /HPF (ref 0–6)
SEGMENTED NEUTROPHILS ABSOLUTE COUNT: 9 K/CU MM
SEGMENTED NEUTROPHILS RELATIVE PERCENT: 77 % (ref 36–66)
SODIUM BLD-SCNC: 133 MMOL/L (ref 135–145)
SPECIFIC GRAVITY UA: 1.02 (ref 1–1.03)
SQUAMOUS EPITHELIAL: 11 /HPF
TOTAL IMMATURE NEUTOROPHIL: 0.06 K/CU MM
TOTAL NUCLEATED RBC: 0 K/CU MM
TOTAL PROTEIN: 7.8 GM/DL (ref 6.4–8.2)
TRICHOMONAS: ABNORMAL /HPF
UROBILINOGEN, URINE: NORMAL MG/DL (ref 0.2–1)
WBC # BLD: 11.7 K/CU MM (ref 4–10.5)
WBC UA: 2 /HPF (ref 0–5)

## 2020-12-23 PROCEDURE — 99285 EMERGENCY DEPT VISIT HI MDM: CPT

## 2020-12-23 PROCEDURE — 96374 THER/PROPH/DIAG INJ IV PUSH: CPT

## 2020-12-23 PROCEDURE — 87086 URINE CULTURE/COLONY COUNT: CPT

## 2020-12-23 PROCEDURE — 96375 TX/PRO/DX INJ NEW DRUG ADDON: CPT

## 2020-12-23 PROCEDURE — 85025 COMPLETE CBC W/AUTO DIFF WBC: CPT

## 2020-12-23 PROCEDURE — 2580000003 HC RX 258: Performed by: PHYSICIAN ASSISTANT

## 2020-12-23 PROCEDURE — 83690 ASSAY OF LIPASE: CPT

## 2020-12-23 PROCEDURE — 87077 CULTURE AEROBIC IDENTIFY: CPT

## 2020-12-23 PROCEDURE — 80053 COMPREHEN METABOLIC PANEL: CPT

## 2020-12-23 PROCEDURE — 96361 HYDRATE IV INFUSION ADD-ON: CPT

## 2020-12-23 PROCEDURE — 81001 URINALYSIS AUTO W/SCOPE: CPT

## 2020-12-23 PROCEDURE — 74176 CT ABD & PELVIS W/O CONTRAST: CPT

## 2020-12-23 PROCEDURE — 6360000002 HC RX W HCPCS: Performed by: PHYSICIAN ASSISTANT

## 2020-12-23 RX ORDER — KETOROLAC TROMETHAMINE 10 MG/1
10 TABLET, FILM COATED ORAL EVERY 6 HOURS PRN
Qty: 16 TABLET | Refills: 0 | Status: SHIPPED | OUTPATIENT
Start: 2020-12-23 | End: 2021-02-12

## 2020-12-23 RX ORDER — 0.9 % SODIUM CHLORIDE 0.9 %
1000 INTRAVENOUS SOLUTION INTRAVENOUS ONCE
Status: COMPLETED | OUTPATIENT
Start: 2020-12-23 | End: 2020-12-23

## 2020-12-23 RX ORDER — KETOROLAC TROMETHAMINE 30 MG/ML
30 INJECTION, SOLUTION INTRAMUSCULAR; INTRAVENOUS ONCE
Status: COMPLETED | OUTPATIENT
Start: 2020-12-23 | End: 2020-12-23

## 2020-12-23 RX ORDER — ONDANSETRON 4 MG/1
4 TABLET, ORALLY DISINTEGRATING ORAL EVERY 6 HOURS
Qty: 10 TABLET | Refills: 0 | Status: SHIPPED | OUTPATIENT
Start: 2020-12-23 | End: 2021-02-12

## 2020-12-23 RX ORDER — ONDANSETRON 2 MG/ML
4 INJECTION INTRAMUSCULAR; INTRAVENOUS ONCE
Status: COMPLETED | OUTPATIENT
Start: 2020-12-23 | End: 2020-12-23

## 2020-12-23 RX ORDER — ALBUTEROL SULFATE 90 UG/1
2 AEROSOL, METERED RESPIRATORY (INHALATION) EVERY 6 HOURS PRN
Qty: 1 INHALER | Refills: 0 | Status: SHIPPED | OUTPATIENT
Start: 2020-12-23 | End: 2021-02-12

## 2020-12-23 RX ADMIN — KETOROLAC TROMETHAMINE 30 MG: 30 INJECTION, SOLUTION INTRAMUSCULAR; INTRAVENOUS at 13:31

## 2020-12-23 RX ADMIN — ONDANSETRON 4 MG: 2 INJECTION INTRAMUSCULAR; INTRAVENOUS at 13:32

## 2020-12-23 RX ADMIN — SODIUM CHLORIDE 1000 ML: 9 INJECTION, SOLUTION INTRAVENOUS at 13:31

## 2020-12-23 ASSESSMENT — PAIN SCALES - GENERAL
PAINLEVEL_OUTOF10: 10
PAINLEVEL_OUTOF10: 10

## 2020-12-23 ASSESSMENT — PAIN DESCRIPTION - LOCATION: LOCATION: FLANK

## 2020-12-23 ASSESSMENT — PAIN DESCRIPTION - ORIENTATION: ORIENTATION: LEFT

## 2020-12-23 ASSESSMENT — PAIN DESCRIPTION - PAIN TYPE: TYPE: ACUTE PAIN

## 2020-12-23 NOTE — ED PROVIDER NOTES
eMERGENCY dEPARTMENT eNCOUnter      PCP: Angelica Gomes MD    [facility]    Chief Complaint   Patient presents with    Flank Pain     left sided       HPI    Shayna Dumont is a 39 y.o. female who presents with left flank pain. Pain began last evening and has been persistent since onset with varying severity. It intermittently radiates into the left mid abdomen. She endorses associated nausea and vomiting. No dysuria, hematuria, vaginal symptoms, diarrhea, constipation. No associated fevers. No chest pain or shortness of breath. Patient endorses history of kidney stones and states that she has had previous swelling of her left kidney and current symptoms feel similar. She had been previously referred to urology, however states that she has been feeling well so has never followed up there. REVIEW OF SYSTEMS    Constitutional:  Denies fever, chills, weight loss or weakness   HENT:  Denies sore throat or ear pain   Cardiovascular:  Denies chest pain, palpitations or swelling   Respiratory:  Denies cough or shortness of breath   GI:  See HPI above  : No hematuria or dysuria. No vaginal symptoms. Musculoskeletal:  Denies back pain or groin pain or masses. No pain or swelling of extremities.   Skin:  Denies rash  Neurologic:  Denies headache, focal weakness or sensory changes   Endocrine:  Denies polyuria or polydypsia   Lymphatic:  Denies swollen glands     All other review of systems are negative  See HPI and nursing notes for additional information     PAST MEDICAL & SURGICAL HISTORY    Past Medical History:   Diagnosis Date    Asthma     last flare up 11/2016    Migraine     Last migraine 12/2015    Ovarian cyst     Rt side    Wears glasses      Past Surgical History:   Procedure Laterality Date   [address] [facility], [address] [place] [address] Ave, 1996, 2000, 2006( with BPS)    x 5    HERNIA REPAIR      OTHER SURGICAL HISTORY  12 29 2015    robotic assist lap ventral hernia repair with mesh graft    UMBILICAL HERNIA REPAIR  03/31/15    laparoscopic with mesh    UMBILICAL HERNIA REPAIR  01/23/2017    Robotic    VENTRAL HERNIA REPAIR  01/23/2017    Robotic    WISDOM TOOTH EXTRACTION      3 of 4 extracted       CURRENT MEDICATIONS    Current Outpatient Rx   Medication Sig Dispense Refill    ondansetron (ZOFRAN ODT) 4 MG disintegrating tablet Take 1 tablet by mouth every 8 hours as needed for Nausea 15 tablet 0    naproxen (NAPROSYN) 500 MG tablet Take 1 tablet by mouth 2 times daily 60 tablet 0    albuterol sulfate HFA (VENTOLIN HFA) 108 (90 Base) MCG/ACT inhaler Inhale 2 puffs into the lungs every 4 hours as needed for Wheezing 1 Inhaler 1    lidocaine (LIDODERM) 5 % Place 1 patch onto the skin daily 12 hours on, 12 hours off. 30 patch 0    famotidine (PEPCID) 20 MG tablet Take 1 tablet by mouth 2 times daily 30 tablet 0    docusate sodium (COLACE) 100 MG capsule Take 1 capsule by mouth daily as needed for Constipation (while taking pain medicine) 30 capsule 3    ibuprofen (ADVIL) 200 MG tablet Take 2 tablets by mouth every 8 hours as needed for Pain 120 tablet 1    ondansetron (ZOFRAN) 4 MG tablet Take 1 tablet by mouth every 8 hours as needed for Nausea or Vomiting 30 tablet 0    ibuprofen (ADVIL;MOTRIN) 600 MG tablet Take 1 tablet by mouth every 8 hours as needed for Pain 30 tablet 1    albuterol (PROVENTIL) (2.5 MG/3ML) 0.083% nebulizer solution Take 2.5 mg by nebulization every 6 hours as needed for Wheezing      acetaminophen (TYLENOL) 500 MG tablet Take 1,000 mg by mouth every 6 hours as needed for Pain      Acetaminophen-Caff-Pyrilamine (MIDOL COMPLETE PO) Take 2 tablets by mouth 2 times daily as needed       Budesonide-Formoterol Fumarate (SYMBICORT) 80-4.5 MCG/ACT AERO Inhale 2 puffs into the lungs 2 times daily.            ALLERGIES    Allergies   Allergen Reactions    Ciprofloxacin Hives       SOCIAL AND FAMILY HISTORY    Social History     Socioeconomic History    Marital status: Legally      Spouse name: None    Number of children: None    Years of education: None    Highest education level: None   Occupational History    None   Social Needs    Financial resource strain: None    Food insecurity     Worry: None     Inability: None    Transportation needs     Medical: None     Non-medical: None   Tobacco Use    Smoking status: Current Some Day Smoker     Packs/day: 0.50     Years: 8.00     Pack years: 4.00     Types: Cigarettes    Smokeless tobacco: Never Used   Substance and Sexual Activity    Alcohol use: Yes     Comment: occasional       CAFFEINE: 2 liter soda daily    Drug use: No    Sexual activity: Yes     Partners: Male   Lifestyle    Physical activity     Days per week: None     Minutes per session: None    Stress: None   Relationships    Social connections     Talks on phone: None     Gets together: None     Attends Restoration service: None     Active member of club or organization: None     Attends meetings of clubs or organizations: None     Relationship status: None    Intimate partner violence     Fear of current or ex partner: None     Emotionally abused: None     Physically abused: None     Forced sexual activity: None   Other Topics Concern    None   Social History Narrative    None     Family History   Problem Relation Age of Onset    Diabetes Mother     Other Mother         thyroid    High Blood Pressure Mother     COPD Mother     Kidney Disease Mother     High Blood Pressure Sister     Emphysema Sister     High Blood Pressure Sister     Early Death Son     Hearing Loss Daughter        PHYSICAL EXAM    VITAL SIGNS: BP (!) 193/110   Pulse 86   Temp 97.7 °F (36.5 °C) (Oral)   Resp 22   Ht 5' 9\" (1.753 m)   Wt 240 lb (108.9 kg)   SpO2 95%   BMI 35.44 kg/m²   Constitutional:  Well developed, well nourished. No distress  Eyes:  Sclera nonicteric, conjunctiva moist  HENT:  Atraumatic. PERRL. EOMI.  Moist mucus membranes. Neck/Lymphatics: supple, no JVD, no swollen nodes  Respiratory:  No retractions, no accessory muscle use, normal breath sounds   Cardiovascular:   normal rate, normal rhythm, no murmurs    GI:     No gross discoloration. Bowel sounds present, no audible bruits. Soft,  no distention, no guarding, no rigidity,   + left mid abdominal tenderness, no rebound tenderness, no palpable pulsatile masses,   No McBurney's point tenderness   Negative Rovsing sign    Negative Herrera's sign. Back:  + left sided CVA tenderness to percussion.   Musculoskeletal:  No edema, no deformity  Vascular: DP pulses 2+ equal bilaterally  Integument: No rash, dry skin  Neurologic:  Alert & oriented, normal speech  Psychiatric: Cooperative, pleasant affect       LABS:  Results for orders placed or performed during the hospital encounter of 12/23/20   CBC auto diff   Result Value Ref Range    WBC 11.7 (H) 4.0 - 10.5 K/CU MM    RBC 4.70 4.2 - 5.4 M/CU MM    Hemoglobin 14.6 12.5 - 16.0 GM/DL    Hematocrit 43.7 37 - 47 %    MCV 93.0 78 - 100 FL    MCH 31.1 (H) 27 - 31 PG    MCHC 33.4 32.0 - 36.0 %    RDW 13.2 11.7 - 14.9 %    Platelets 872 670 - 040 K/CU MM    MPV 10.9 7.5 - 11.1 FL    Differential Type AUTOMATED DIFFERENTIAL     Segs Relative 77.0 (H) 36 - 66 %    Lymphocytes % 15.9 (L) 24 - 44 %    Monocytes % 5.5 (H) 0 - 4 %    Eosinophils % 0.8 0 - 3 %    Basophils % 0.3 0 - 1 %    Segs Absolute 9.0 K/CU MM    Lymphocytes Absolute 1.9 K/CU MM    Monocytes Absolute 0.6 K/CU MM    Eosinophils Absolute 0.1 K/CU MM    Basophils Absolute 0.0 K/CU MM    Nucleated RBC % 0.0 %    Total Nucleated RBC 0.0 K/CU MM    Total Immature Neutrophil 0.06 K/CU MM    Immature Neutrophil % 0.5 (H) 0 - 0.43 %   Urinalysis   Result Value Ref Range    Color, UA YELLOW YELLOW    Clarity, UA HAZY (A) CLEAR    Glucose, Urine NEGATIVE NEGATIVE MG/DL    Bilirubin Urine NEGATIVE NEGATIVE MG/DL    Ketones, Urine NEGATIVE NEGATIVE MG/DL    Specific [place], UA for consultation, throughout entirety of  patient care. Patient presents as above. She is hypertensive on arrival, does have known history of this and states that she has not taken her blood pressure medications today due to the pain and nausea. We discussed that she will need to have this rechecked with her primary care doctor within the next week. Lab work with mild hyponatremia 133, leukocytosis 11.7. Normal kidney function. Urinalysis negative for bacteria, 2 white blood cells, 1 red blood cell with 11 squamous epithelial cells. CT abdomen and pelvis with a high-grade partial left ureteropelvic junction obstruction unchanged from September 12, 2020. No renal or ureteral calculi identified. No mass, ascites or lymphadenopathy. No colitis, diverticulitis or appendicitis. Patient is resting comfortably on subsequent evaluation, reports improvement of pain following Toradol, Zofran, fluids. On chart review, patient had similar presentation and work-up in September. At that time she was referred to urology although she did not have any continued symptoms she did not follow-up there. I discussed the need for close outpatient urology follow-up for further evaluation and treatment of these partial left ureteropelvic junction obstruction. At this time, not seeing any evidence of stone or other mechanical obstruction. We discussed this may be etiology of her pain, however will need close follow-up for further evaluation and treatment options. Patient is agreeable with this plan and comfortable with discharge to follow-up with urology. Prior to discharge, patient also requesting a medication refill of her albuterol inhaler. This is provided as well. Diagnosis, disposition, and treatment plan reviewed with patient and/or family who understands and agrees. Patient understands and agrees to follow up with urology in 2 days.  Patient understands and agrees to return to the emergency department for any new or worsening symptoms. Clinical  IMPRESSION    1. Ureteropelvic junction (UPJ) obstruction, left    2. Flank pain    3. Elevated blood pressure reading            Comment: Please note this report has been produced using speech recognition software and may contain errors related to that system including errors in grammar, punctuation, and spelling, as well as words and phrases that may be inappropriate. If there are any questions or concerns please feel free to contact the dictating provider for clarification.         Rafiq Ribeiro, [address]  12/23/20 4446

## 2020-12-23 NOTE — ED TRIAGE NOTES
Pt presents to the ER with complaints of left sided flank pain; pt states that she was here a couple of months ago for same thing; thought she might have kidney stones then; pt states that this pain started last night; pt states that the pain she is having is also making her feel sick to her stomach at times; no signs of distress noted; pt is alert and oriented

## 2020-12-23 NOTE — ED NOTES
Patient present to ED for Left kidney pain, patient states she was seen here a couple month ago for left kidney being swollen     Bianca Falcon  12/23/20 2618

## 2020-12-25 LAB
CULTURE: NORMAL
Lab: NORMAL
SPECIMEN: NORMAL

## 2021-01-13 ENCOUNTER — HOSPITAL ENCOUNTER (OUTPATIENT)
Dept: CT IMAGING | Age: 46
Discharge: HOME | End: 2021-01-13
Payer: COMMERCIAL

## 2021-01-13 DIAGNOSIS — N13.0 ACQUIRED HYDRONEPHROSIS WITH URETEROPELVIC JUNCTION (UPJ) OBSTRUCTION: ICD-10-CM

## 2021-01-13 DIAGNOSIS — R31.9 HEMATURIA, UNSPECIFIED TYPE: ICD-10-CM

## 2021-01-13 PROCEDURE — 74178 CT ABD&PLV WO CNTR FLWD CNTR: CPT

## 2021-01-13 PROCEDURE — 2580000003 HC RX 258: Performed by: UROLOGY

## 2021-01-13 PROCEDURE — 6360000004 HC RX CONTRAST MEDICATION: Performed by: UROLOGY

## 2021-01-13 RX ORDER — SODIUM CHLORIDE 0.9 % (FLUSH) 0.9 %
10 SYRINGE (ML) INJECTION PRN
Status: DISCONTINUED | OUTPATIENT
Start: 2021-01-13 | End: 2021-01-14 | Stop reason: HOSPADM

## 2021-01-13 RX ADMIN — SODIUM CHLORIDE, PRESERVATIVE FREE 10 ML: 5 INJECTION INTRAVENOUS at 10:30

## 2021-01-13 RX ADMIN — IOPAMIDOL 75 ML: 755 INJECTION, SOLUTION INTRAVENOUS at 10:30

## 2021-02-12 ENCOUNTER — HOSPITAL ENCOUNTER (EMERGENCY)
Age: 46
Discharge: HOME | End: 2021-02-12
Payer: COMMERCIAL

## 2021-02-12 ENCOUNTER — APPOINTMENT (OUTPATIENT)
Dept: CT IMAGING | Age: 46
End: 2021-02-12
Payer: COMMERCIAL

## 2021-02-12 VITALS
RESPIRATION RATE: 16 BRPM | WEIGHT: 240 LBS | DIASTOLIC BLOOD PRESSURE: 78 MMHG | BODY MASS INDEX: 35.55 KG/M2 | SYSTOLIC BLOOD PRESSURE: 132 MMHG | HEART RATE: 87 BPM | OXYGEN SATURATION: 100 % | TEMPERATURE: 98 F | HEIGHT: 69 IN

## 2021-02-12 DIAGNOSIS — R51.9 ACUTE NONINTRACTABLE HEADACHE, UNSPECIFIED HEADACHE TYPE: ICD-10-CM

## 2021-02-12 DIAGNOSIS — I10 HYPERTENSION, UNSPECIFIED TYPE: Primary | ICD-10-CM

## 2021-02-12 LAB
ALBUMIN SERPL-MCNC: 4.1 GM/DL (ref 3.4–5)
ALP BLD-CCNC: 130 IU/L (ref 40–128)
ALT SERPL-CCNC: 12 U/L (ref 10–40)
ANION GAP SERPL CALCULATED.3IONS-SCNC: 11 MMOL/L (ref 4–16)
AST SERPL-CCNC: 17 IU/L (ref 15–37)
BASOPHILS ABSOLUTE: 0.1 K/CU MM
BASOPHILS RELATIVE PERCENT: 0.5 % (ref 0–1)
BILIRUB SERPL-MCNC: 0.3 MG/DL (ref 0–1)
BUN BLDV-MCNC: 11 MG/DL (ref 6–23)
CALCIUM SERPL-MCNC: 10 MG/DL (ref 8.3–10.6)
CHLORIDE BLD-SCNC: 104 MMOL/L (ref 99–110)
CO2: 22 MMOL/L (ref 21–32)
CREAT SERPL-MCNC: 0.9 MG/DL (ref 0.6–1.1)
DIFFERENTIAL TYPE: ABNORMAL
EOSINOPHILS ABSOLUTE: 0.1 K/CU MM
EOSINOPHILS RELATIVE PERCENT: 1.1 % (ref 0–3)
GFR AFRICAN AMERICAN: >60 ML/MIN/1.73M2
GFR NON-AFRICAN AMERICAN: >60 ML/MIN/1.73M2
GLUCOSE BLD-MCNC: 139 MG/DL (ref 70–99)
HCT VFR BLD CALC: 41.9 % (ref 37–47)
HEMOGLOBIN: 14 GM/DL (ref 12.5–16)
IMMATURE NEUTROPHIL %: 0.5 % (ref 0–0.43)
LYMPHOCYTES ABSOLUTE: 2.6 K/CU MM
LYMPHOCYTES RELATIVE PERCENT: 23.7 % (ref 24–44)
MCH RBC QN AUTO: 31.3 PG (ref 27–31)
MCHC RBC AUTO-ENTMCNC: 33.4 % (ref 32–36)
MCV RBC AUTO: 93.5 FL (ref 78–100)
MONOCYTES ABSOLUTE: 0.6 K/CU MM
MONOCYTES RELATIVE PERCENT: 5.6 % (ref 0–4)
NUCLEATED RBC %: 0 %
PDW BLD-RTO: 13.4 % (ref 11.7–14.9)
PLATELET # BLD: 309 K/CU MM (ref 140–440)
PMV BLD AUTO: 11 FL (ref 7.5–11.1)
POTASSIUM SERPL-SCNC: 4.2 MMOL/L (ref 3.5–5.1)
RBC # BLD: 4.48 M/CU MM (ref 4.2–5.4)
REASON FOR REJECTION: NORMAL
REASON FOR REJECTION: NORMAL
REJECTED TEST: NORMAL
REJECTED TEST: NORMAL
SEGMENTED NEUTROPHILS ABSOLUTE COUNT: 7.5 K/CU MM
SEGMENTED NEUTROPHILS RELATIVE PERCENT: 68.6 % (ref 36–66)
SODIUM BLD-SCNC: 137 MMOL/L (ref 135–145)
TOTAL IMMATURE NEUTOROPHIL: 0.06 K/CU MM
TOTAL NUCLEATED RBC: 0 K/CU MM
TOTAL PROTEIN: 7.1 GM/DL (ref 6.4–8.2)
TROPONIN T: <0.01 NG/ML
WBC # BLD: 11 K/CU MM (ref 4–10.5)

## 2021-02-12 PROCEDURE — 99284 EMERGENCY DEPT VISIT MOD MDM: CPT

## 2021-02-12 PROCEDURE — 93010 ELECTROCARDIOGRAM REPORT: CPT | Performed by: INTERNAL MEDICINE

## 2021-02-12 PROCEDURE — 70450 CT HEAD/BRAIN W/O DYE: CPT

## 2021-02-12 PROCEDURE — 85025 COMPLETE CBC W/AUTO DIFF WBC: CPT

## 2021-02-12 PROCEDURE — 84484 ASSAY OF TROPONIN QUANT: CPT

## 2021-02-12 PROCEDURE — 80053 COMPREHEN METABOLIC PANEL: CPT

## 2021-02-12 PROCEDURE — 93005 ELECTROCARDIOGRAM TRACING: CPT | Performed by: PHYSICIAN ASSISTANT

## 2021-02-12 RX ORDER — ALBUTEROL SULFATE 90 UG/1
2 AEROSOL, METERED RESPIRATORY (INHALATION) EVERY 6 HOURS PRN
Qty: 1 INHALER | Refills: 0 | Status: SHIPPED | OUTPATIENT
Start: 2021-02-12 | End: 2021-08-20

## 2021-02-12 NOTE — ED NOTES
Patient arrives to this facility today for complaints of high blood pressure and needing to have it checked out. Reports she was supposed to have a surgery today and they cancelled due to patients blood pressure. Patient reports her PCP does not see her any longer due to covid and she is on the waiting list for the [facility]. Patient reports she has headaches but denies pain at this time. [address]  Francisco Lofton  02/12/21 1309

## 2021-02-12 NOTE — ED PROVIDER NOTES
[facility]      PCP: Lisa Santizo MD    CHIEF COMPLAINT    Chief Complaint   Patient presents with    Headache     hypertension, reports BP was too high for surgery to be performed today and advised to be seen at this facility       This patient was not evaluated by the attending physician. I have independently evaluated this patient. HPI    Martina Frank is a 39 y.o. female who presents for elevated blood pressure. Patient states she went to have surgery today with urologist and was told they were unable to do the surgery due to her blood pressure being 215/115. Patient states she does have history of hypertension is currently on Lopressor 25 mg twice daily. Patient states she has been taking this in the past week, did take it this morning however is inconsistent with taking the medication. Patient denies significant change in diet. Patient denies chest pain, shortness breath, abdominal pain, vomiting, extremity weakness or numbness. Patient states she started having a mild headache since being here. Patient denies vision changes. REVIEW OF SYSTEMS   Constitutional:  Denies fever  Neurologic:  See HPI. Denies stiff neck. Denies weakness or sensory changes   Eyes:   Denies discharge, dipplopia, blurred vision, or loss visual field  HENT:  Denies sore throat or ear pain   Cardiovascular:  Denies chest pain  Respiratory:  Denies cough, shortness of breath  GI  Denies abdominal pain. Denies vomiting, or diarrhea. :  Denies Dysuria or Hematuria.   Musculoskeletal: Denies lower extremity pain or swelling  Skin:  Denies rash   Lymphatic:  Denies swollen glands     All other review of systems negative at this time  See HPI and nursing notes for additional information      PAST MEDICAL & SURGICAL HISTORY    Past Medical History:   Diagnosis Date    Asthma     last flare up 11/2016    Migraine     Last migraine 12/2015    Ovarian cyst     Rt side    Wears glasses      Past Comment: occasional       CAFFEINE: 2 liter soda daily    Drug use: No    Sexual activity: Yes     Partners: Male   Lifestyle    Physical activity     Days per week: Not on file     Minutes per session: Not on file    Stress: Not on file   Relationships    Social connections     Talks on phone: Not on file     Gets together: Not on file     Attends Voodoo service: Not on file     Active member of club or organization: Not on file     Attends meetings of clubs or organizations: Not on file     Relationship status: Not on file    Intimate partner violence     Fear of current or ex partner: Not on file     Emotionally abused: Not on file     Physically abused: Not on file     Forced sexual activity: Not on file   Other Topics Concern    Not on file   Social History Narrative    Not on file     Family History   Problem Relation Age of Onset    Diabetes Mother     Other Mother         thyroid    High Blood Pressure Mother     COPD Mother     Kidney Disease Mother     High Blood Pressure Sister     Emphysema Sister     High Blood Pressure Sister     Early Death Son     Hearing Loss Daughter        PHYSICAL EXAM    VITAL SIGNS: /78   Pulse 87   Temp 98 °F (36.7 °C) (Oral)   Resp 16   Ht 5' 9\" (1.753 m)   Wt 240 lb (108.9 kg)   SpO2 100%   BMI 35.44 kg/m²    Constitutional:  Well developed, well nourished, no acute distress   HENT:  Atraumatic. Eyes: Conjunctiva clear. No tearing . Pupils equally round and react to light, extraocular movement are intact. Neck: supple, no JVD.     Cardiovascular:  Reg rate & rhythm on exam  Respiratory:  Lungs Clear, no retractions   GI:  Soft, nontender, normal bowel sounds  Musculoskeletal:  No edema, no deformities  Integument:  Well hydrated, no petechiae   Neurologic:    - Alert & oriented person, place, time, and situation, no speech difficulties or slurring.  - No obvious gross motor deficits  - Cranial nerves 2-12 grossly intact  - Sensation intact to light touch  - Strength 5/5 in upper and lower extremities bilaterally  -No meningeal signs  - Normal finger to nose test bilaterally  - Rapid alternating movements intact  - No pronator drift. - Light touch sensation intact throughout.   Psych: Pleasant affect, no hallucinations      LABS:   Results for orders placed or performed during the hospital encounter of 02/12/21   CBC with Auto Diff   Result Value Ref Range    WBC 11.0 (H) 4.0 - 10.5 K/CU MM    RBC 4.48 4.2 - 5.4 M/CU MM    Hemoglobin 14.0 12.5 - 16.0 GM/DL    Hematocrit 41.9 37 - 47 %    MCV 93.5 78 - 100 FL    MCH 31.3 (H) 27 - 31 PG    MCHC 33.4 32.0 - 36.0 %    RDW 13.4 11.7 - 14.9 %    Platelets 533 073 - 719 K/CU MM    MPV 11.0 7.5 - 11.1 FL    Differential Type AUTOMATED DIFFERENTIAL     Segs Relative 68.6 (H) 36 - 66 %    Lymphocytes % 23.7 (L) 24 - 44 %    Monocytes % 5.6 (H) 0 - 4 %    Eosinophils % 1.1 0 - 3 %    Basophils % 0.5 0 - 1 %    Segs Absolute 7.5 K/CU MM    Lymphocytes Absolute 2.6 K/CU MM    Monocytes Absolute 0.6 K/CU MM    Eosinophils Absolute 0.1 K/CU MM    Basophils Absolute 0.1 K/CU MM    Nucleated RBC % 0.0 %    Total Nucleated RBC 0.0 K/CU MM    Total Immature Neutrophil 0.06 K/CU MM    Immature Neutrophil % 0.5 (H) 0 - 0.43 %   SPECIMEN REJECTION   Result Value Ref Range    Rejected Test CMPR     Reason for Rejection UNABLE TO PERFORM TESTING:    SPECIMEN REJECTION   Result Value Ref Range    Rejected Test TROT     Reason for Rejection UNABLE TO PERFORM TESTING:    Comprehensive Metabolic Panel   Result Value Ref Range    Sodium 137 135 - 145 MMOL/L    Potassium 4.2 3.5 - 5.1 MMOL/L    Chloride 104 99 - 110 mMol/L    CO2 22 21 - 32 MMOL/L    BUN 11 6 - 23 MG/DL    CREATININE 0.9 0.6 - 1.1 MG/DL    Glucose 139 (H) 70 - 99 MG/DL    Calcium 10.0 8.3 - 10.6 MG/DL    Albumin 4.1 3.4 - 5.0 GM/DL    Total Protein 7.1 6.4 - 8.2 GM/DL    Total Bilirubin 0.3 0.0 - 1.0 MG/DL    ALT 12 10 - 40 U/L    AST 17 15 - 37 IU/L Alkaline Phosphatase 130 (H) 40 - 128 IU/L    GFR Non-African American >60 >60 mL/min/1.73m2    GFR African American >60 >60 mL/min/1.73m2    Anion Gap 11 4 - 16   Troponin   Result Value Ref Range    Troponin T <0.010 <0.01 NG/ML   EKG 12 Lead   Result Value Ref Range    Ventricular Rate 91 BPM    Atrial Rate 91 BPM    P-R Interval 202 ms    QRS Duration 90 ms    Q-T Interval 372 ms    QTc Calculation (Bazett) 457 ms    P Axis 48 degrees    R Axis 29 degrees    T Axis 35 degrees    Diagnosis       Normal sinus rhythm  Low voltage QRS  Borderline ECG  When compared with ECG of 14-DEC-2019 10:29,  No significant change was found             IMAGING:   CT HEAD WO CONTRAST   Final Result   1. No acute intracranial abnormality. EKG Interpretation  Please see ED physician's note for EKG interpretation         ED [address] [facility]      Patient presents as above. Patient is neurological intact, no meningeal signs. Patient describes headache is mild. Patient's blood pressure is initially elevated to 220s over 120s. With elevated blood pressure, headache CT imaging of head is ordered which shows no acute intracranial abnormality. CBC shows mild elevation white blood cell count 11 otherwise within normal lites. Troponin negative. CMP within normal notes. See supervising physician note for EKG reading. On reevaluation patient states headache is resolved and she is feeling much better. Blood pressure improved to 132/78. Recommend she continue her Lopressor as prescribed. Recommend dietary changes, exercise and weight loss. I have provided information for [facility], list of local primary care accepting new patients. Patient provided refill on rescue inhaler. Clinical  IMPRESSION    1. Hypertension, unspecified type    2. Acute nonintractable headache, unspecified headache type          Diagnosis and plan discussed in detail with patient who understands and agrees. Return to emergency Department precautions, which included any change in nature or severity of headaches or any new symptoms, were discussed in detail with patient who understands and agrees. Comment: Please note this report has been produced using speech recognition software and may contain errors related to that system including errors in grammar, punctuation, and spelling, as well as words and phrases that may be inappropriate. If there are any questions or concerns please feel free to contact the dictating provider for clarification.             Marilee Cruz PA-C  02/12/21 [id number]

## 2021-02-16 LAB
EKG ATRIAL RATE: 91 BPM
EKG DIAGNOSIS: NORMAL
EKG P AXIS: 48 DEGREES
EKG P-R INTERVAL: 202 MS
EKG Q-T INTERVAL: 372 MS
EKG QRS DURATION: 90 MS
EKG QTC CALCULATION (BAZETT): 457 MS
EKG R AXIS: 29 DEGREES
EKG T AXIS: 35 DEGREES
EKG VENTRICULAR RATE: 91 BPM

## 2021-03-04 ENCOUNTER — HOSPITAL ENCOUNTER (OUTPATIENT)
Dept: NUCLEAR MEDICINE | Age: 46
Discharge: HOME | End: 2021-03-04
Payer: COMMERCIAL

## 2021-03-04 DIAGNOSIS — N13.0 ACQUIRED HYDRONEPHROSIS WITH URETEROPELVIC JUNCTION (UPJ) OBSTRUCTION: ICD-10-CM

## 2021-03-04 PROCEDURE — 6360000002 HC RX W HCPCS: Performed by: PHYSICIAN ASSISTANT

## 2021-03-04 PROCEDURE — 3430000000 HC RX DIAGNOSTIC RADIOPHARMACEUTICAL: Performed by: PHYSICIAN ASSISTANT

## 2021-03-04 PROCEDURE — A9562 TC99M MERTIATIDE: HCPCS | Performed by: PHYSICIAN ASSISTANT

## 2021-03-04 PROCEDURE — 78708 K FLOW/FUNCT IMAGE W/DRUG: CPT

## 2021-03-04 RX ORDER — FUROSEMIDE 10 MG/ML
40 INJECTION INTRAMUSCULAR; INTRAVENOUS ONCE
Status: COMPLETED | OUTPATIENT
Start: 2021-03-04 | End: 2021-03-04

## 2021-03-04 RX ADMIN — FUROSEMIDE 40 MG: 10 INJECTION, SOLUTION INTRAVENOUS at 11:25

## 2021-03-04 RX ADMIN — Medication 5.8 MILLICURIE: at 11:05

## 2021-05-28 ENCOUNTER — HOSPITAL ENCOUNTER (OUTPATIENT)
Age: 46
Discharge: HOME | End: 2021-05-28
Payer: COMMERCIAL

## 2021-05-28 ENCOUNTER — HOSPITAL ENCOUNTER (OUTPATIENT)
Dept: GENERAL RADIOLOGY | Age: 46
Discharge: HOME | End: 2021-05-28
Payer: COMMERCIAL

## 2021-05-28 DIAGNOSIS — M54.50 LOW BACK PAIN, UNSPECIFIED BACK PAIN LATERALITY, UNSPECIFIED CHRONICITY, UNSPECIFIED WHETHER SCIATICA PRESENT: ICD-10-CM

## 2021-05-28 PROCEDURE — 72100 X-RAY EXAM L-S SPINE 2/3 VWS: CPT

## 2021-08-20 ENCOUNTER — HOSPITAL ENCOUNTER (EMERGENCY)
Age: 46
Discharge: HOME | End: 2021-08-20
Payer: COMMERCIAL

## 2021-08-20 ENCOUNTER — APPOINTMENT (OUTPATIENT)
Dept: CT IMAGING | Age: 46
End: 2021-08-20
Payer: COMMERCIAL

## 2021-08-20 VITALS
BODY MASS INDEX: 35.1 KG/M2 | TEMPERATURE: 98.3 F | WEIGHT: 237 LBS | SYSTOLIC BLOOD PRESSURE: 172 MMHG | DIASTOLIC BLOOD PRESSURE: 124 MMHG | HEART RATE: 98 BPM | RESPIRATION RATE: 15 BRPM | HEIGHT: 69 IN | OXYGEN SATURATION: 96 %

## 2021-08-20 DIAGNOSIS — R10.9 LEFT FLANK PAIN: Primary | ICD-10-CM

## 2021-08-20 DIAGNOSIS — R11.2 NON-INTRACTABLE VOMITING WITH NAUSEA, UNSPECIFIED VOMITING TYPE: ICD-10-CM

## 2021-08-20 DIAGNOSIS — N13.30 HYDRONEPHROSIS, UNSPECIFIED HYDRONEPHROSIS TYPE: ICD-10-CM

## 2021-08-20 LAB
ALBUMIN SERPL-MCNC: 4.5 GM/DL (ref 3.4–5)
ALP BLD-CCNC: 147 IU/L (ref 40–129)
ALT SERPL-CCNC: 15 U/L (ref 10–40)
ANION GAP SERPL CALCULATED.3IONS-SCNC: 13 MMOL/L (ref 4–16)
AST SERPL-CCNC: 20 IU/L (ref 15–37)
BACTERIA: NEGATIVE /HPF
BASOPHILS ABSOLUTE: 0.1 K/CU MM
BASOPHILS RELATIVE PERCENT: 0.6 % (ref 0–1)
BILIRUB SERPL-MCNC: 0.4 MG/DL (ref 0–1)
BILIRUBIN URINE: NEGATIVE MG/DL
BLOOD, URINE: NEGATIVE
BUN BLDV-MCNC: 12 MG/DL (ref 6–23)
CALCIUM SERPL-MCNC: 10.5 MG/DL (ref 8.3–10.6)
CHLORIDE BLD-SCNC: 98 MMOL/L (ref 99–110)
CLARITY: ABNORMAL
CO2: 24 MMOL/L (ref 21–32)
COLOR: YELLOW
CREAT SERPL-MCNC: 1.1 MG/DL (ref 0.6–1.1)
DIFFERENTIAL TYPE: ABNORMAL
EKG ATRIAL RATE: 76 BPM
EKG DIAGNOSIS: NORMAL
EKG P AXIS: 39 DEGREES
EKG P-R INTERVAL: 188 MS
EKG Q-T INTERVAL: 390 MS
EKG QRS DURATION: 98 MS
EKG QTC CALCULATION (BAZETT): 438 MS
EKG R AXIS: 9 DEGREES
EKG T AXIS: 65 DEGREES
EKG VENTRICULAR RATE: 76 BPM
EOSINOPHILS ABSOLUTE: 0.1 K/CU MM
EOSINOPHILS RELATIVE PERCENT: 0.8 % (ref 0–3)
GFR AFRICAN AMERICAN: >60 ML/MIN/1.73M2
GFR NON-AFRICAN AMERICAN: 53 ML/MIN/1.73M2
GLUCOSE BLD-MCNC: 151 MG/DL (ref 70–99)
GLUCOSE, URINE: NEGATIVE MG/DL
HCG QUALITATIVE: NEGATIVE
HCT VFR BLD CALC: 43.5 % (ref 37–47)
HEMOGLOBIN: 14.4 GM/DL (ref 12.5–16)
IMMATURE NEUTROPHIL %: 0.3 % (ref 0–0.43)
KETONES, URINE: NEGATIVE MG/DL
LEUKOCYTE ESTERASE, URINE: NEGATIVE
LIPASE: 39 IU/L (ref 13–60)
LYMPHOCYTES ABSOLUTE: 2.3 K/CU MM
LYMPHOCYTES RELATIVE PERCENT: 23.1 % (ref 24–44)
MCH RBC QN AUTO: 31.5 PG (ref 27–31)
MCHC RBC AUTO-ENTMCNC: 33.1 % (ref 32–36)
MCV RBC AUTO: 95.2 FL (ref 78–100)
MONOCYTES ABSOLUTE: 0.6 K/CU MM
MONOCYTES RELATIVE PERCENT: 5.8 % (ref 0–4)
NITRITE URINE, QUANTITATIVE: NEGATIVE
NUCLEATED RBC %: 0 %
PDW BLD-RTO: 12.9 % (ref 11.7–14.9)
PH, URINE: 5 (ref 5–8)
PLATELET # BLD: 295 K/CU MM (ref 140–440)
PMV BLD AUTO: 10.3 FL (ref 7.5–11.1)
POTASSIUM SERPL-SCNC: 3.6 MMOL/L (ref 3.5–5.1)
PROTEIN UA: NEGATIVE MG/DL
RBC # BLD: 4.57 M/CU MM (ref 4.2–5.4)
RBC URINE: <1 /HPF (ref 0–6)
SEGMENTED NEUTROPHILS ABSOLUTE COUNT: 7 K/CU MM
SEGMENTED NEUTROPHILS RELATIVE PERCENT: 69.4 % (ref 36–66)
SODIUM BLD-SCNC: 135 MMOL/L (ref 135–145)
SPECIFIC GRAVITY UA: 1.02 (ref 1–1.03)
SQUAMOUS EPITHELIAL: 12 /HPF
TOTAL IMMATURE NEUTOROPHIL: 0.03 K/CU MM
TOTAL NUCLEATED RBC: 0 K/CU MM
TOTAL PROTEIN: 8.1 GM/DL (ref 6.4–8.2)
TRICHOMONAS: ABNORMAL /HPF
TROPONIN T: <0.01 NG/ML
UROBILINOGEN, URINE: NEGATIVE MG/DL (ref 0.2–1)
WBC # BLD: 10.1 K/CU MM (ref 4–10.5)
WBC UA: 2 /HPF (ref 0–5)

## 2021-08-20 PROCEDURE — 96374 THER/PROPH/DIAG INJ IV PUSH: CPT

## 2021-08-20 PROCEDURE — 80053 COMPREHEN METABOLIC PANEL: CPT

## 2021-08-20 PROCEDURE — 6360000002 HC RX W HCPCS: Performed by: PHYSICIAN ASSISTANT

## 2021-08-20 PROCEDURE — 83690 ASSAY OF LIPASE: CPT

## 2021-08-20 PROCEDURE — 84484 ASSAY OF TROPONIN QUANT: CPT

## 2021-08-20 PROCEDURE — 99285 EMERGENCY DEPT VISIT HI MDM: CPT

## 2021-08-20 PROCEDURE — 87086 URINE CULTURE/COLONY COUNT: CPT

## 2021-08-20 PROCEDURE — 74177 CT ABD & PELVIS W/CONTRAST: CPT

## 2021-08-20 PROCEDURE — 6370000000 HC RX 637 (ALT 250 FOR IP): Performed by: PHYSICIAN ASSISTANT

## 2021-08-20 PROCEDURE — 6360000004 HC RX CONTRAST MEDICATION: Performed by: PHYSICIAN ASSISTANT

## 2021-08-20 PROCEDURE — 85025 COMPLETE CBC W/AUTO DIFF WBC: CPT

## 2021-08-20 PROCEDURE — 93005 ELECTROCARDIOGRAM TRACING: CPT | Performed by: PHYSICIAN ASSISTANT

## 2021-08-20 PROCEDURE — 93010 ELECTROCARDIOGRAM REPORT: CPT | Performed by: INTERNAL MEDICINE

## 2021-08-20 PROCEDURE — 84703 CHORIONIC GONADOTROPIN ASSAY: CPT

## 2021-08-20 PROCEDURE — 2580000003 HC RX 258: Performed by: PHYSICIAN ASSISTANT

## 2021-08-20 PROCEDURE — 81001 URINALYSIS AUTO W/SCOPE: CPT

## 2021-08-20 RX ORDER — ALBUTEROL SULFATE 90 UG/1
2 AEROSOL, METERED RESPIRATORY (INHALATION) EVERY 6 HOURS PRN
Qty: 1 INHALER | Refills: 0 | Status: ON HOLD | OUTPATIENT
Start: 2021-08-20 | End: 2022-04-17 | Stop reason: HOSPADM

## 2021-08-20 RX ORDER — ONDANSETRON 2 MG/ML
4 INJECTION INTRAMUSCULAR; INTRAVENOUS ONCE
Status: COMPLETED | OUTPATIENT
Start: 2021-08-20 | End: 2021-08-20

## 2021-08-20 RX ORDER — ONDANSETRON 4 MG/1
4 TABLET, ORALLY DISINTEGRATING ORAL EVERY 8 HOURS PRN
Qty: 15 TABLET | Refills: 0 | Status: SHIPPED | OUTPATIENT
Start: 2021-08-20 | End: 2022-04-11

## 2021-08-20 RX ORDER — 0.9 % SODIUM CHLORIDE 0.9 %
1000 INTRAVENOUS SOLUTION INTRAVENOUS ONCE
Status: COMPLETED | OUTPATIENT
Start: 2021-08-20 | End: 2021-08-20

## 2021-08-20 RX ORDER — NAPROXEN 500 MG/1
500 TABLET ORAL 2 TIMES DAILY
Qty: 15 TABLET | Refills: 0 | Status: SHIPPED | OUTPATIENT
Start: 2021-08-20 | End: 2022-04-11

## 2021-08-20 RX ORDER — HYDROCODONE BITARTRATE AND ACETAMINOPHEN 5; 325 MG/1; MG/1
1 TABLET ORAL ONCE
Status: COMPLETED | OUTPATIENT
Start: 2021-08-20 | End: 2021-08-20

## 2021-08-20 RX ADMIN — HYDROCODONE BITARTRATE AND ACETAMINOPHEN 1 TABLET: 5; 325 TABLET ORAL at 11:45

## 2021-08-20 RX ADMIN — SODIUM CHLORIDE 1000 ML: 9 INJECTION, SOLUTION INTRAVENOUS at 11:46

## 2021-08-20 RX ADMIN — IOPAMIDOL 80 ML: 755 INJECTION, SOLUTION INTRAVENOUS at 11:22

## 2021-08-20 RX ADMIN — ONDANSETRON 4 MG: 2 INJECTION INTRAMUSCULAR; INTRAVENOUS at 11:46

## 2021-08-20 ASSESSMENT — PAIN DESCRIPTION - ORIENTATION: ORIENTATION: LEFT

## 2021-08-20 ASSESSMENT — PAIN SCALES - GENERAL
PAINLEVEL_OUTOF10: 10
PAINLEVEL_OUTOF10: 8

## 2021-08-20 ASSESSMENT — PAIN DESCRIPTION - PAIN TYPE: TYPE: ACUTE PAIN

## 2021-08-20 ASSESSMENT — PAIN DESCRIPTION - LOCATION: LOCATION: FLANK

## 2021-08-21 LAB
CULTURE: NORMAL
Lab: NORMAL
SPECIMEN: NORMAL

## 2021-09-16 ENCOUNTER — APPOINTMENT (OUTPATIENT)
Dept: GENERAL RADIOLOGY | Age: 46
End: 2021-09-16
Payer: COMMERCIAL

## 2021-09-16 ENCOUNTER — APPOINTMENT (OUTPATIENT)
Dept: CT IMAGING | Age: 46
End: 2021-09-16
Payer: COMMERCIAL

## 2021-09-16 ENCOUNTER — HOSPITAL ENCOUNTER (OUTPATIENT)
Age: 46
Setting detail: OBSERVATION
Discharge: HOME | End: 2021-09-18
Attending: STUDENT IN AN ORGANIZED HEALTH CARE EDUCATION/TRAINING PROGRAM | Admitting: HOSPITALIST
Payer: COMMERCIAL

## 2021-09-16 DIAGNOSIS — R55 SYNCOPE AND COLLAPSE: ICD-10-CM

## 2021-09-16 DIAGNOSIS — R10.9 FLANK PAIN: ICD-10-CM

## 2021-09-16 DIAGNOSIS — S09.90XA CLOSED HEAD INJURY, INITIAL ENCOUNTER: ICD-10-CM

## 2021-09-16 DIAGNOSIS — N13.30 HYDRONEPHROSIS OF LEFT KIDNEY: ICD-10-CM

## 2021-09-16 DIAGNOSIS — N13.5 URETEROPELVIC JUNCTION OBSTRUCTION: Primary | ICD-10-CM

## 2021-09-16 DIAGNOSIS — I10 ESSENTIAL HYPERTENSION: ICD-10-CM

## 2021-09-16 PROBLEM — N23 RENAL COLIC ON RIGHT SIDE: Status: ACTIVE | Noted: 2021-09-16

## 2021-09-16 LAB
ALBUMIN SERPL-MCNC: 4.3 GM/DL (ref 3.4–5)
ALP BLD-CCNC: 149 IU/L (ref 40–128)
ALT SERPL-CCNC: 17 U/L (ref 10–40)
ANION GAP SERPL CALCULATED.3IONS-SCNC: 14 MMOL/L (ref 4–16)
AST SERPL-CCNC: 19 IU/L (ref 15–37)
BACTERIA: NEGATIVE /HPF
BASOPHILS ABSOLUTE: 0.1 K/CU MM
BASOPHILS RELATIVE PERCENT: 0.4 % (ref 0–1)
BILIRUB SERPL-MCNC: 0.3 MG/DL (ref 0–1)
BILIRUBIN URINE: NEGATIVE MG/DL
BLOOD, URINE: ABNORMAL
BUN BLDV-MCNC: 13 MG/DL (ref 6–23)
CALCIUM SERPL-MCNC: 10.3 MG/DL (ref 8.3–10.6)
CHLORIDE BLD-SCNC: 100 MMOL/L (ref 99–110)
CLARITY: CLEAR
CO2: 22 MMOL/L (ref 21–32)
COLOR: ABNORMAL
CREAT SERPL-MCNC: 0.9 MG/DL (ref 0.6–1.1)
DIFFERENTIAL TYPE: ABNORMAL
EOSINOPHILS ABSOLUTE: 0 K/CU MM
EOSINOPHILS RELATIVE PERCENT: 0.1 % (ref 0–3)
GFR AFRICAN AMERICAN: >60 ML/MIN/1.73M2
GFR NON-AFRICAN AMERICAN: >60 ML/MIN/1.73M2
GLUCOSE BLD-MCNC: 118 MG/DL (ref 70–99)
GLUCOSE, URINE: NEGATIVE MG/DL
HCG QUALITATIVE: NEGATIVE
HCT VFR BLD CALC: 42.3 % (ref 37–47)
HEMOGLOBIN: 13.8 GM/DL (ref 12.5–16)
IMMATURE NEUTROPHIL %: 0.3 % (ref 0–0.43)
KETONES, URINE: NEGATIVE MG/DL
LEUKOCYTE ESTERASE, URINE: NEGATIVE
LIPASE: 27 IU/L (ref 13–60)
LYMPHOCYTES ABSOLUTE: 1.6 K/CU MM
LYMPHOCYTES RELATIVE PERCENT: 11.3 % (ref 24–44)
MCH RBC QN AUTO: 30.9 PG (ref 27–31)
MCHC RBC AUTO-ENTMCNC: 32.6 % (ref 32–36)
MCV RBC AUTO: 94.8 FL (ref 78–100)
MONOCYTES ABSOLUTE: 0.6 K/CU MM
MONOCYTES RELATIVE PERCENT: 4 % (ref 0–4)
NITRITE URINE, QUANTITATIVE: NEGATIVE
NON SQUAM EPI CELLS: <1 /HPF
NUCLEATED RBC %: 0 %
PDW BLD-RTO: 12.5 % (ref 11.7–14.9)
PH, URINE: 7 (ref 5–8)
PLATELET # BLD: 263 K/CU MM (ref 140–440)
PMV BLD AUTO: 10.9 FL (ref 7.5–11.1)
POTASSIUM SERPL-SCNC: 4.2 MMOL/L (ref 3.5–5.1)
PROTEIN UA: NEGATIVE MG/DL
RBC # BLD: 4.46 M/CU MM (ref 4.2–5.4)
RBC URINE: <1 /HPF (ref 0–6)
SEGMENTED NEUTROPHILS ABSOLUTE COUNT: 11.6 K/CU MM
SEGMENTED NEUTROPHILS RELATIVE PERCENT: 83.9 % (ref 36–66)
SODIUM BLD-SCNC: 136 MMOL/L (ref 135–145)
SPECIFIC GRAVITY UA: 1.05 (ref 1–1.03)
SQUAMOUS EPITHELIAL: 4 /HPF
TOTAL IMMATURE NEUTOROPHIL: 0.04 K/CU MM
TOTAL NUCLEATED RBC: 0 K/CU MM
TOTAL PROTEIN: 7.9 GM/DL (ref 6.4–8.2)
TRICHOMONAS: ABNORMAL /HPF
TROPONIN T: <0.01 NG/ML
UROBILINOGEN, URINE: NEGATIVE MG/DL (ref 0.2–1)
WBC # BLD: 13.9 K/CU MM (ref 4–10.5)
WBC UA: <1 /HPF (ref 0–5)

## 2021-09-16 PROCEDURE — 84703 CHORIONIC GONADOTROPIN ASSAY: CPT

## 2021-09-16 PROCEDURE — 99285 EMERGENCY DEPT VISIT HI MDM: CPT

## 2021-09-16 PROCEDURE — 93005 ELECTROCARDIOGRAM TRACING: CPT | Performed by: STUDENT IN AN ORGANIZED HEALTH CARE EDUCATION/TRAINING PROGRAM

## 2021-09-16 PROCEDURE — 84484 ASSAY OF TROPONIN QUANT: CPT

## 2021-09-16 PROCEDURE — 6360000002 HC RX W HCPCS: Performed by: HOSPITALIST

## 2021-09-16 PROCEDURE — 6360000004 HC RX CONTRAST MEDICATION: Performed by: STUDENT IN AN ORGANIZED HEALTH CARE EDUCATION/TRAINING PROGRAM

## 2021-09-16 PROCEDURE — 96375 TX/PRO/DX INJ NEW DRUG ADDON: CPT

## 2021-09-16 PROCEDURE — 70486 CT MAXILLOFACIAL W/O DYE: CPT

## 2021-09-16 PROCEDURE — 80053 COMPREHEN METABOLIC PANEL: CPT

## 2021-09-16 PROCEDURE — 96374 THER/PROPH/DIAG INJ IV PUSH: CPT

## 2021-09-16 PROCEDURE — 81001 URINALYSIS AUTO W/SCOPE: CPT

## 2021-09-16 PROCEDURE — 2580000003 HC RX 258: Performed by: HOSPITALIST

## 2021-09-16 PROCEDURE — 70450 CT HEAD/BRAIN W/O DYE: CPT

## 2021-09-16 PROCEDURE — 6360000002 HC RX W HCPCS: Performed by: STUDENT IN AN ORGANIZED HEALTH CARE EDUCATION/TRAINING PROGRAM

## 2021-09-16 PROCEDURE — 36415 COLL VENOUS BLD VENIPUNCTURE: CPT

## 2021-09-16 PROCEDURE — 6370000000 HC RX 637 (ALT 250 FOR IP): Performed by: HOSPITALIST

## 2021-09-16 PROCEDURE — 85025 COMPLETE CBC W/AUTO DIFF WBC: CPT

## 2021-09-16 PROCEDURE — 71045 X-RAY EXAM CHEST 1 VIEW: CPT

## 2021-09-16 PROCEDURE — G0378 HOSPITAL OBSERVATION PER HR: HCPCS

## 2021-09-16 PROCEDURE — 74177 CT ABD & PELVIS W/CONTRAST: CPT

## 2021-09-16 PROCEDURE — 83690 ASSAY OF LIPASE: CPT

## 2021-09-16 RX ORDER — ONDANSETRON 2 MG/ML
4 INJECTION INTRAMUSCULAR; INTRAVENOUS EVERY 6 HOURS PRN
Status: DISCONTINUED | OUTPATIENT
Start: 2021-09-16 | End: 2021-09-18 | Stop reason: HOSPADM

## 2021-09-16 RX ORDER — ONDANSETRON 2 MG/ML
4 INJECTION INTRAMUSCULAR; INTRAVENOUS ONCE
Status: COMPLETED | OUTPATIENT
Start: 2021-09-16 | End: 2021-09-16

## 2021-09-16 RX ORDER — POTASSIUM CHLORIDE 7.45 MG/ML
10 INJECTION INTRAVENOUS PRN
Status: DISCONTINUED | OUTPATIENT
Start: 2021-09-16 | End: 2021-09-18 | Stop reason: HOSPADM

## 2021-09-16 RX ORDER — MAGNESIUM SULFATE IN WATER 40 MG/ML
2000 INJECTION, SOLUTION INTRAVENOUS PRN
Status: DISCONTINUED | OUTPATIENT
Start: 2021-09-16 | End: 2021-09-18 | Stop reason: HOSPADM

## 2021-09-16 RX ORDER — SODIUM CHLORIDE 9 MG/ML
25 INJECTION, SOLUTION INTRAVENOUS PRN
Status: DISCONTINUED | OUTPATIENT
Start: 2021-09-16 | End: 2021-09-18 | Stop reason: HOSPADM

## 2021-09-16 RX ORDER — SODIUM CHLORIDE 9 MG/ML
INJECTION, SOLUTION INTRAVENOUS CONTINUOUS
Status: DISCONTINUED | OUTPATIENT
Start: 2021-09-16 | End: 2021-09-18 | Stop reason: HOSPADM

## 2021-09-16 RX ORDER — MORPHINE SULFATE 4 MG/ML
2 INJECTION, SOLUTION INTRAMUSCULAR; INTRAVENOUS EVERY 4 HOURS PRN
Status: DISCONTINUED | OUTPATIENT
Start: 2021-09-16 | End: 2021-09-18 | Stop reason: HOSPADM

## 2021-09-16 RX ORDER — SODIUM CHLORIDE 0.9 % (FLUSH) 0.9 %
5-40 SYRINGE (ML) INJECTION PRN
Status: DISCONTINUED | OUTPATIENT
Start: 2021-09-16 | End: 2021-09-18 | Stop reason: HOSPADM

## 2021-09-16 RX ORDER — POTASSIUM CHLORIDE 20 MEQ/1
40 TABLET, EXTENDED RELEASE ORAL PRN
Status: DISCONTINUED | OUTPATIENT
Start: 2021-09-16 | End: 2021-09-18 | Stop reason: HOSPADM

## 2021-09-16 RX ORDER — SODIUM CHLORIDE 0.9 % (FLUSH) 0.9 %
5-40 SYRINGE (ML) INJECTION EVERY 12 HOURS SCHEDULED
Status: DISCONTINUED | OUTPATIENT
Start: 2021-09-16 | End: 2021-09-18 | Stop reason: HOSPADM

## 2021-09-16 RX ORDER — NICOTINE 21 MG/24HR
1 PATCH, TRANSDERMAL 24 HOURS TRANSDERMAL DAILY
Status: DISCONTINUED | OUTPATIENT
Start: 2021-09-17 | End: 2021-09-18 | Stop reason: HOSPADM

## 2021-09-16 RX ORDER — HYDRALAZINE HYDROCHLORIDE 20 MG/ML
10 INJECTION INTRAMUSCULAR; INTRAVENOUS EVERY 6 HOURS PRN
Status: DISCONTINUED | OUTPATIENT
Start: 2021-09-16 | End: 2021-09-17

## 2021-09-16 RX ORDER — ACETAMINOPHEN 500 MG
500 TABLET ORAL EVERY 6 HOURS PRN
Status: DISCONTINUED | OUTPATIENT
Start: 2021-09-16 | End: 2021-09-16

## 2021-09-16 RX ORDER — FENTANYL CITRATE 50 UG/ML
50 INJECTION, SOLUTION INTRAMUSCULAR; INTRAVENOUS ONCE
Status: COMPLETED | OUTPATIENT
Start: 2021-09-16 | End: 2021-09-16

## 2021-09-16 RX ORDER — ACETAMINOPHEN 650 MG/1
650 SUPPOSITORY RECTAL EVERY 6 HOURS PRN
Status: DISCONTINUED | OUTPATIENT
Start: 2021-09-16 | End: 2021-09-18 | Stop reason: HOSPADM

## 2021-09-16 RX ORDER — HYDRALAZINE HYDROCHLORIDE 20 MG/ML
5 INJECTION INTRAMUSCULAR; INTRAVENOUS ONCE
Status: COMPLETED | OUTPATIENT
Start: 2021-09-16 | End: 2021-09-16

## 2021-09-16 RX ORDER — ALBUTEROL SULFATE 90 UG/1
2 AEROSOL, METERED RESPIRATORY (INHALATION) EVERY 6 HOURS PRN
Status: DISCONTINUED | OUTPATIENT
Start: 2021-09-16 | End: 2021-09-18 | Stop reason: HOSPADM

## 2021-09-16 RX ORDER — METOPROLOL TARTRATE 50 MG/1
25 TABLET, FILM COATED ORAL 2 TIMES DAILY
Status: DISCONTINUED | OUTPATIENT
Start: 2021-09-16 | End: 2021-09-18 | Stop reason: HOSPADM

## 2021-09-16 RX ORDER — PANTOPRAZOLE SODIUM 40 MG/10ML
40 INJECTION, POWDER, LYOPHILIZED, FOR SOLUTION INTRAVENOUS DAILY
Status: DISCONTINUED | OUTPATIENT
Start: 2021-09-17 | End: 2021-09-18 | Stop reason: HOSPADM

## 2021-09-16 RX ORDER — LISINOPRIL 20 MG/1
20 TABLET ORAL DAILY
Status: DISCONTINUED | OUTPATIENT
Start: 2021-09-17 | End: 2021-09-18 | Stop reason: HOSPADM

## 2021-09-16 RX ORDER — ONDANSETRON 4 MG/1
4 TABLET, ORALLY DISINTEGRATING ORAL EVERY 8 HOURS PRN
Status: DISCONTINUED | OUTPATIENT
Start: 2021-09-16 | End: 2021-09-18 | Stop reason: HOSPADM

## 2021-09-16 RX ORDER — PROMETHAZINE HYDROCHLORIDE 25 MG/ML
12.5 INJECTION, SOLUTION INTRAMUSCULAR; INTRAVENOUS ONCE
Status: COMPLETED | OUTPATIENT
Start: 2021-09-17 | End: 2021-09-17

## 2021-09-16 RX ORDER — ACETAMINOPHEN 325 MG/1
650 TABLET ORAL EVERY 6 HOURS PRN
Status: DISCONTINUED | OUTPATIENT
Start: 2021-09-16 | End: 2021-09-18 | Stop reason: HOSPADM

## 2021-09-16 RX ORDER — TAMSULOSIN HYDROCHLORIDE 0.4 MG/1
0.4 CAPSULE ORAL DAILY
Status: DISCONTINUED | OUTPATIENT
Start: 2021-09-17 | End: 2021-09-17

## 2021-09-16 RX ORDER — POLYETHYLENE GLYCOL 3350 17 G/17G
17 POWDER, FOR SOLUTION ORAL DAILY PRN
Status: DISCONTINUED | OUTPATIENT
Start: 2021-09-16 | End: 2021-09-18 | Stop reason: HOSPADM

## 2021-09-16 RX ORDER — LABETALOL HYDROCHLORIDE 5 MG/ML
20 INJECTION, SOLUTION INTRAVENOUS EVERY 4 HOURS PRN
Status: DISCONTINUED | OUTPATIENT
Start: 2021-09-16 | End: 2021-09-17

## 2021-09-16 RX ADMIN — FENTANYL CITRATE 50 MCG: 0.05 INJECTION, SOLUTION INTRAMUSCULAR; INTRAVENOUS at 21:15

## 2021-09-16 RX ADMIN — METOPROLOL TARTRATE 25 MG: 50 TABLET, FILM COATED ORAL at 23:45

## 2021-09-16 RX ADMIN — SODIUM CHLORIDE: 9 INJECTION, SOLUTION INTRAVENOUS at 23:44

## 2021-09-16 RX ADMIN — IOPAMIDOL 80 ML: 755 INJECTION, SOLUTION INTRAVENOUS at 19:52

## 2021-09-16 RX ADMIN — ONDANSETRON 4 MG: 2 INJECTION INTRAMUSCULAR; INTRAVENOUS at 21:14

## 2021-09-16 RX ADMIN — MORPHINE SULFATE 2 MG: 4 INJECTION INTRAVENOUS at 23:44

## 2021-09-16 RX ADMIN — HYDRALAZINE HYDROCHLORIDE 5 MG: 20 INJECTION INTRAMUSCULAR; INTRAVENOUS at 22:37

## 2021-09-16 ASSESSMENT — PAIN SCALES - GENERAL
PAINLEVEL_OUTOF10: 7
PAINLEVEL_OUTOF10: 10

## 2021-09-17 ENCOUNTER — ANESTHESIA EVENT (OUTPATIENT)
Dept: OPERATING ROOM | Age: 46
End: 2021-09-17
Payer: COMMERCIAL

## 2021-09-17 ENCOUNTER — APPOINTMENT (OUTPATIENT)
Dept: GENERAL RADIOLOGY | Age: 46
End: 2021-09-17
Payer: COMMERCIAL

## 2021-09-17 ENCOUNTER — ANESTHESIA (OUTPATIENT)
Dept: OPERATING ROOM | Age: 46
End: 2021-09-17
Payer: COMMERCIAL

## 2021-09-17 VITALS
OXYGEN SATURATION: 100 % | RESPIRATION RATE: 16 BRPM | DIASTOLIC BLOOD PRESSURE: 68 MMHG | SYSTOLIC BLOOD PRESSURE: 110 MMHG

## 2021-09-17 LAB
ALBUMIN SERPL-MCNC: 4.5 GM/DL (ref 3.4–5)
ALP BLD-CCNC: 148 IU/L (ref 40–129)
ALT SERPL-CCNC: 16 U/L (ref 10–40)
ANION GAP SERPL CALCULATED.3IONS-SCNC: 13 MMOL/L (ref 4–16)
AST SERPL-CCNC: 17 IU/L (ref 15–37)
BASOPHILS ABSOLUTE: 0 K/CU MM
BASOPHILS RELATIVE PERCENT: 0.3 % (ref 0–1)
BILIRUB SERPL-MCNC: 0.4 MG/DL (ref 0–1)
BUN BLDV-MCNC: 9 MG/DL (ref 6–23)
CALCIUM SERPL-MCNC: 10.7 MG/DL (ref 8.3–10.6)
CHLORIDE BLD-SCNC: 96 MMOL/L (ref 99–110)
CO2: 24 MMOL/L (ref 21–32)
CREAT SERPL-MCNC: 0.7 MG/DL (ref 0.6–1.1)
DIFFERENTIAL TYPE: ABNORMAL
EKG ATRIAL RATE: 66 BPM
EKG DIAGNOSIS: NORMAL
EKG P AXIS: 39 DEGREES
EKG P-R INTERVAL: 186 MS
EKG Q-T INTERVAL: 424 MS
EKG QRS DURATION: 96 MS
EKG QTC CALCULATION (BAZETT): 444 MS
EKG R AXIS: 12 DEGREES
EKG T AXIS: 72 DEGREES
EKG VENTRICULAR RATE: 66 BPM
EOSINOPHILS ABSOLUTE: 0 K/CU MM
EOSINOPHILS RELATIVE PERCENT: 0.4 % (ref 0–3)
GFR AFRICAN AMERICAN: >60 ML/MIN/1.73M2
GFR NON-AFRICAN AMERICAN: >60 ML/MIN/1.73M2
GLUCOSE BLD-MCNC: 124 MG/DL (ref 70–99)
HCT VFR BLD CALC: 43.3 % (ref 37–47)
HEMOGLOBIN: 14.4 GM/DL (ref 12.5–16)
IMMATURE NEUTROPHIL %: 0.6 % (ref 0–0.43)
INTERPRETATION: NORMAL
LYMPHOCYTES ABSOLUTE: 1.8 K/CU MM
LYMPHOCYTES RELATIVE PERCENT: 15.8 % (ref 24–44)
MCH RBC QN AUTO: 31.7 PG (ref 27–31)
MCHC RBC AUTO-ENTMCNC: 33.3 % (ref 32–36)
MCV RBC AUTO: 95.4 FL (ref 78–100)
MONOCYTES ABSOLUTE: 0.7 K/CU MM
MONOCYTES RELATIVE PERCENT: 5.7 % (ref 0–4)
NUCLEATED RBC %: 0 %
PDW BLD-RTO: 12.6 % (ref 11.7–14.9)
PLATELET # BLD: 268 K/CU MM (ref 140–440)
PMV BLD AUTO: 10.8 FL (ref 7.5–11.1)
POTASSIUM SERPL-SCNC: 4.3 MMOL/L (ref 3.5–5.1)
PREGNANCY, URINE: NEGATIVE
RBC # BLD: 4.54 M/CU MM (ref 4.2–5.4)
SARS-COV-2, NAAT: NOT DETECTED
SEGMENTED NEUTROPHILS ABSOLUTE COUNT: 8.8 K/CU MM
SEGMENTED NEUTROPHILS RELATIVE PERCENT: 77.2 % (ref 36–66)
SODIUM BLD-SCNC: 133 MMOL/L (ref 135–145)
SPECIFIC GRAVITY, URINE: 1.01 (ref 1–1.03)
TOTAL IMMATURE NEUTOROPHIL: 0.07 K/CU MM
TOTAL NUCLEATED RBC: 0 K/CU MM
TOTAL PROTEIN: 7.3 GM/DL (ref 6.4–8.2)
WBC # BLD: 11.4 K/CU MM (ref 4–10.5)

## 2021-09-17 PROCEDURE — C1758 CATHETER, URETERAL: HCPCS | Performed by: UROLOGY

## 2021-09-17 PROCEDURE — C1769 GUIDE WIRE: HCPCS | Performed by: UROLOGY

## 2021-09-17 PROCEDURE — 6360000002 HC RX W HCPCS: Performed by: NURSE PRACTITIONER

## 2021-09-17 PROCEDURE — 94761 N-INVAS EAR/PLS OXIMETRY MLT: CPT

## 2021-09-17 PROCEDURE — 36415 COLL VENOUS BLD VENIPUNCTURE: CPT

## 2021-09-17 PROCEDURE — 2500000003 HC RX 250 WO HCPCS: Performed by: NURSE PRACTITIONER

## 2021-09-17 PROCEDURE — 96372 THER/PROPH/DIAG INJ SC/IM: CPT

## 2021-09-17 PROCEDURE — 2580000003 HC RX 258: Performed by: NURSE PRACTITIONER

## 2021-09-17 PROCEDURE — 87635 SARS-COV-2 COVID-19 AMP PRB: CPT

## 2021-09-17 PROCEDURE — 6370000000 HC RX 637 (ALT 250 FOR IP): Performed by: HOSPITALIST

## 2021-09-17 PROCEDURE — 87086 URINE CULTURE/COLONY COUNT: CPT

## 2021-09-17 PROCEDURE — 93010 ELECTROCARDIOGRAM REPORT: CPT | Performed by: INTERNAL MEDICINE

## 2021-09-17 PROCEDURE — 3600000013 HC SURGERY LEVEL 3 ADDTL 15MIN: Performed by: UROLOGY

## 2021-09-17 PROCEDURE — 76000 FLUOROSCOPY <1 HR PHYS/QHP: CPT

## 2021-09-17 PROCEDURE — 2580000003 HC RX 258: Performed by: HOSPITALIST

## 2021-09-17 PROCEDURE — G0378 HOSPITAL OBSERVATION PER HR: HCPCS

## 2021-09-17 PROCEDURE — 85025 COMPLETE CBC W/AUTO DIFF WBC: CPT

## 2021-09-17 PROCEDURE — 6360000002 HC RX W HCPCS: Performed by: HOSPITALIST

## 2021-09-17 PROCEDURE — 3700000001 HC ADD 15 MINUTES (ANESTHESIA): Performed by: UROLOGY

## 2021-09-17 PROCEDURE — 88108 CYTOPATH CONCENTRATE TECH: CPT

## 2021-09-17 PROCEDURE — 96365 THER/PROPH/DIAG IV INF INIT: CPT

## 2021-09-17 PROCEDURE — 3600000003 HC SURGERY LEVEL 3 BASE: Performed by: UROLOGY

## 2021-09-17 PROCEDURE — 96366 THER/PROPH/DIAG IV INF ADDON: CPT

## 2021-09-17 PROCEDURE — 6360000004 HC RX CONTRAST MEDICATION: Performed by: UROLOGY

## 2021-09-17 PROCEDURE — 81025 URINE PREGNANCY TEST: CPT

## 2021-09-17 PROCEDURE — 96376 TX/PRO/DX INJ SAME DRUG ADON: CPT

## 2021-09-17 PROCEDURE — C2617 STENT, NON-COR, TEM W/O DEL: HCPCS | Performed by: UROLOGY

## 2021-09-17 PROCEDURE — 3700000000 HC ANESTHESIA ATTENDED CARE: Performed by: UROLOGY

## 2021-09-17 PROCEDURE — 96375 TX/PRO/DX INJ NEW DRUG ADDON: CPT

## 2021-09-17 PROCEDURE — 6360000002 HC RX W HCPCS: Performed by: NURSE ANESTHETIST, CERTIFIED REGISTERED

## 2021-09-17 PROCEDURE — C9113 INJ PANTOPRAZOLE SODIUM, VIA: HCPCS | Performed by: HOSPITALIST

## 2021-09-17 PROCEDURE — 2500000003 HC RX 250 WO HCPCS: Performed by: NURSE ANESTHETIST, CERTIFIED REGISTERED

## 2021-09-17 PROCEDURE — 80053 COMPREHEN METABOLIC PANEL: CPT

## 2021-09-17 PROCEDURE — 96367 TX/PROPH/DG ADDL SEQ IV INF: CPT

## 2021-09-17 PROCEDURE — 2709999900 HC NON-CHARGEABLE SUPPLY: Performed by: UROLOGY

## 2021-09-17 DEVICE — VARIABLE LENGTH URETERAL STENT
Type: IMPLANTABLE DEVICE | Site: URETER | Status: FUNCTIONAL
Brand: CONTOUR VL™

## 2021-09-17 RX ORDER — LIDOCAINE HYDROCHLORIDE 20 MG/ML
INJECTION, SOLUTION EPIDURAL; INFILTRATION; INTRACAUDAL; PERINEURAL PRN
Status: DISCONTINUED | OUTPATIENT
Start: 2021-09-17 | End: 2021-09-17 | Stop reason: SDUPTHER

## 2021-09-17 RX ORDER — PROPOFOL 10 MG/ML
INJECTION, EMULSION INTRAVENOUS PRN
Status: DISCONTINUED | OUTPATIENT
Start: 2021-09-17 | End: 2021-09-17 | Stop reason: SDUPTHER

## 2021-09-17 RX ORDER — FENTANYL CITRATE 50 UG/ML
INJECTION, SOLUTION INTRAMUSCULAR; INTRAVENOUS PRN
Status: DISCONTINUED | OUTPATIENT
Start: 2021-09-17 | End: 2021-09-17 | Stop reason: SDUPTHER

## 2021-09-17 RX ADMIN — MORPHINE SULFATE 2 MG: 4 INJECTION INTRAVENOUS at 03:53

## 2021-09-17 RX ADMIN — CEFTRIAXONE SODIUM 1000 MG: 1 INJECTION, POWDER, FOR SOLUTION INTRAMUSCULAR; INTRAVENOUS at 00:13

## 2021-09-17 RX ADMIN — HYDRALAZINE HYDROCHLORIDE 10 MG: 20 INJECTION INTRAMUSCULAR; INTRAVENOUS at 03:53

## 2021-09-17 RX ADMIN — SODIUM CHLORIDE 20 MG: 9 INJECTION, SOLUTION INTRAVENOUS at 05:54

## 2021-09-17 RX ADMIN — PROPOFOL 50 MG: 10 INJECTION, EMULSION INTRAVENOUS at 16:50

## 2021-09-17 RX ADMIN — ONDANSETRON 4 MG: 2 INJECTION INTRAMUSCULAR; INTRAVENOUS at 15:19

## 2021-09-17 RX ADMIN — METOPROLOL TARTRATE 25 MG: 50 TABLET, FILM COATED ORAL at 20:28

## 2021-09-17 RX ADMIN — CEFTRIAXONE SODIUM 2000 MG: 2 INJECTION, POWDER, FOR SOLUTION INTRAMUSCULAR; INTRAVENOUS at 18:50

## 2021-09-17 RX ADMIN — LISINOPRIL 20 MG: 20 TABLET ORAL at 09:39

## 2021-09-17 RX ADMIN — LIDOCAINE HYDROCHLORIDE 100 MG: 20 INJECTION, SOLUTION EPIDURAL; INFILTRATION; INTRACAUDAL; PERINEURAL at 16:42

## 2021-09-17 RX ADMIN — SODIUM CHLORIDE: 9 INJECTION, SOLUTION INTRAVENOUS at 15:22

## 2021-09-17 RX ADMIN — PROMETHAZINE HYDROCHLORIDE 12.5 MG: 25 INJECTION INTRAMUSCULAR; INTRAVENOUS at 00:13

## 2021-09-17 RX ADMIN — METOPROLOL TARTRATE 25 MG: 50 TABLET, FILM COATED ORAL at 09:38

## 2021-09-17 RX ADMIN — PROPOFOL 50 MG: 10 INJECTION, EMULSION INTRAVENOUS at 16:42

## 2021-09-17 RX ADMIN — PROPOFOL 50 MG: 10 INJECTION, EMULSION INTRAVENOUS at 16:47

## 2021-09-17 RX ADMIN — SODIUM CHLORIDE, PRESERVATIVE FREE 10 ML: 5 INJECTION INTRAVENOUS at 09:38

## 2021-09-17 RX ADMIN — PROPOFOL 50 MG: 10 INJECTION, EMULSION INTRAVENOUS at 16:53

## 2021-09-17 RX ADMIN — PANTOPRAZOLE SODIUM 40 MG: 40 INJECTION, POWDER, FOR SOLUTION INTRAVENOUS at 09:38

## 2021-09-17 RX ADMIN — FENTANYL CITRATE 50 MCG: 50 INJECTION, SOLUTION INTRAMUSCULAR; INTRAVENOUS at 16:39

## 2021-09-17 RX ADMIN — PROPOFOL 50 MG: 10 INJECTION, EMULSION INTRAVENOUS at 16:45

## 2021-09-17 ASSESSMENT — PULMONARY FUNCTION TESTS
PIF_VALUE: 0
PIF_VALUE: 1
PIF_VALUE: 0
PIF_VALUE: 1
PIF_VALUE: 0
PIF_VALUE: 1
PIF_VALUE: 0
PIF_VALUE: 1
PIF_VALUE: 1
PIF_VALUE: 0
PIF_VALUE: 1
PIF_VALUE: 0
PIF_VALUE: 1
PIF_VALUE: 0

## 2021-09-17 ASSESSMENT — PAIN SCALES - GENERAL
PAINLEVEL_OUTOF10: 5
PAINLEVEL_OUTOF10: 0
PAINLEVEL_OUTOF10: 8
PAINLEVEL_OUTOF10: 8

## 2021-09-17 ASSESSMENT — LIFESTYLE VARIABLES: SMOKING_STATUS: 1

## 2021-09-17 NOTE — ED PROVIDER NOTES
Emergency Department Encounter    Patient: Natividad Rodriguez  MRN: [id number]  : 1975  Date of Evaluation: 2021  ED Provider:  Ted Roque MD    Triage Chief Complaint:   Flank Pain (right side x one year)    [place]:  Natividad Rodriguez is a 55 y.o. female with history of UPJ obstruction and other history below presenting with left flank pain and syncopal episode at home. Patient states she had significantly increased left flank pain throughout the day today. Patient states to the pain she had a syncopal episode. States she has had intermittent pain in the left flank for the past year. States today was the worst it has been and was 10 out of 10, constant, stabbing without exacerbating alleviating factors. Patient states when she gets this pain sometimes she gets lightheaded but has never syncopized. Patient states today she did have a syncopal episode and hit her face against the wall and has an abrasion over the left maxilla. Patient denies chest pain or shortness of breath or history of syncope in the past.  Denies lower extremity edema, history of DVTs or blood clots in the past, recent trauma, recent travel, recent surgery, hormone use, hemoptysis, cancer. Denies fevers or chills. Denies urinary symptoms including dysuria, increased frequency, urgency, hematuria. Has nausea with this increased pain. Denies bowel changes including diarrhea, constipation, blood or melena stools. ROS - see HPI, below listed is current ROS at time of my eval:  14 point review of system reviewed, negative other than HPI.     Past Medical History:   Diagnosis Date    Asthma     last flare up 2016    Migraine     Last migraine 2015    Ovarian cyst     Rt side    Wears glasses      Past Surgical History:   Procedure Laterality Date    Ry Helm, , , , ( with BPS)    x 5    HERNIA REPAIR      OTHER SURGICAL HISTORY  2015    robotic assist lap ventral hernia repair with mesh graft    UMBILICAL HERNIA REPAIR  03/31/15    laparoscopic with mesh    UMBILICAL HERNIA REPAIR  01/23/2017    Robotic    VENTRAL HERNIA REPAIR  01/23/2017    Robotic    WISDOM TOOTH EXTRACTION      3 of 4 extracted     Family History   Problem Relation Age of Onset    Diabetes Mother     Other Mother         thyroid    High Blood Pressure Mother     COPD Mother     Kidney Disease Mother     High Blood Pressure Sister     Emphysema Sister     High Blood Pressure Sister     Early Death Son     Hearing Loss Daughter      Social History     Socioeconomic History    Marital status: Legally      Spouse name: Not on file    Number of children: Not on file    Years of education: Not on file    Highest education level: Not on file   Occupational History    Not on file   Tobacco Use    Smoking status: Current Some Day Smoker     Packs/day: 0.50     Years: 8.00     Pack years: 4.00     Types: Cigarettes    Smokeless tobacco: Never Used   Substance and Sexual Activity    Alcohol use: Yes     Comment: occasional       CAFFEINE: 2 liter soda daily    Drug use: No    Sexual activity: Yes     Partners: Male   Other Topics Concern    Not on file   Social History Narrative    Not on file     Social Determinants of Health     Financial Resource Strain:     Difficulty of Paying Living Expenses:    Food Insecurity:     Worried About Running Out of Food in the Last Year:     Ran Out of Food in the Last Year:    Transportation Needs:     Lack of Transportation (Medical):      Lack of Transportation (Non-Medical):    Physical Activity:     Days of Exercise per Week:     Minutes of Exercise per Session:    Stress:     Feeling of Stress :    Social Connections:     Frequency of Communication with Friends and Family:     Frequency of Social Gatherings with Friends and Family:     Attends Latter-[employer] Services:     Active Member of Clubs or Organizations:     Attends Club or Organization Meetings:     Marital Status:    Intimate Partner Violence:     Fear of Current or Ex-Partner:     Emotionally Abused:     Physically Abused:     Sexually Abused:      Current Facility-Administered Medications   Medication Dose Route Frequency Provider Last Rate Last Admin    hydrALAZINE (APRESOLINE) injection 5 mg  5 mg IntraVENous Once Reina Marie MD         Current Outpatient Medications   Medication Sig Dispense Refill    albuterol sulfate  (90 Base) MCG/ACT inhaler Inhale 2 puffs into the lungs every 6 hours as needed for Wheezing or Shortness of Breath (or cough) Please include spacer with instructions for use. 1 Inhaler 0    naproxen (NAPROSYN) 500 MG tablet Take 1 tablet by mouth 2 times daily 15 tablet 0    ondansetron (ZOFRAN ODT) 4 MG disintegrating tablet Take 1 tablet by mouth every 8 hours as needed for Nausea or Vomiting 15 tablet 0    metoprolol tartrate (LOPRESSOR) 25 MG tablet Take 25 mg by mouth 2 times daily      acetaminophen (TYLENOL) 500 MG tablet Take 1,000 mg by mouth every 6 hours as needed for Pain      Acetaminophen-Caff-Pyrilamine (MIDOL COMPLETE PO) Take 2 tablets by mouth 2 times daily as needed        Allergies   Allergen Reactions    Ciprofloxacin Hives       Nursing Notes Reviewed    Physical Exam:  Triage VS:    ED Triage Vitals [09/16/21 1600]   Enc Vitals Group      BP (!) 220/110      Pulse 75      Resp 16      Temp 97.9 °F (36.6 °C)      Temp Source Oral      SpO2 96 %      Weight 237 lb (107.5 kg)      Height 5' 9\" (1.753 m)      Head Circumference       Peak Flow       Pain Score       Pain Loc       Pain Edu? Excl. in [address]? My pulse ox interpretation is - normal    General appearance:  No acute distress. Skin:  Warm. Dry. Eye:  Extraocular movements intact. Ears, nose, mouth and throat:  Oral mucosa moist   Neck:  Trachea midline. Extremity:  No swelling.   Normal ROM     Heart:  Regular rate and rhythm, normal S1 & S2, no extra heart sounds. Perfusion:  intact  Respiratory:  Lungs clear to auscultation bilaterally. Respirations nonlabored. Abdominal:  Normal bowel sounds. Soft. Tenderness palpation over the left flank with voluntary guarding without rebound, no central spinal tenderness, no CVA tenderness. Non distended. Back:  No CVA tenderness to palpation     Neurological:  Alert and oriented times 3. No focal neuro deficits.              Psychiatric:  Appropriate    I have reviewed and interpreted all of the currently available lab results from this visit (if applicable):  Results for orders placed or performed during the hospital encounter of 09/16/21   CBC auto diff   Result Value Ref Range    WBC 13.9 (H) 4.0 - 10.5 K/CU MM    RBC 4.46 4.2 - 5.4 M/CU MM    Hemoglobin 13.8 12.5 - 16.0 GM/DL    Hematocrit 42.3 37 - 47 %    MCV 94.8 78 - 100 FL    MCH 30.9 27 - 31 PG    MCHC 32.6 32.0 - 36.0 %    RDW 12.5 11.7 - 14.9 %    Platelets 841 864 - 256 K/CU MM    MPV 10.9 7.5 - 11.1 FL    Differential Type AUTOMATED DIFFERENTIAL     Segs Relative 83.9 (H) 36 - 66 %    Lymphocytes % 11.3 (L) 24 - 44 %    Monocytes % 4.0 0 - 4 %    Eosinophils % 0.1 0 - 3 %    Basophils % 0.4 0 - 1 %    Segs Absolute 11.6 K/CU MM    Lymphocytes Absolute 1.6 K/CU MM    Monocytes Absolute 0.6 K/CU MM    Eosinophils Absolute 0.0 K/CU MM    Basophils Absolute 0.1 K/CU MM    Nucleated RBC % 0.0 %    Total Nucleated RBC 0.0 K/CU MM    Total Immature Neutrophil 0.04 K/CU MM    Immature Neutrophil % 0.3 0 - 0.43 %   CMP   Result Value Ref Range    Sodium 136 135 - 145 MMOL/L    Potassium 4.2 3.5 - 5.1 MMOL/L    Chloride 100 99 - 110 mMol/L    CO2 22 21 - 32 MMOL/L    BUN 13 6 - 23 MG/DL    CREATININE 0.9 0.6 - 1.1 MG/DL    Glucose 118 (H) 70 - 99 MG/DL    Calcium 10.3 8.3 - 10.6 MG/DL    Albumin 4.3 3.4 - 5.0 GM/DL    Total Protein 7.9 6.4 - 8.2 GM/DL    Total Bilirubin 0.3 0.0 - 1.0 MG/DL    ALT 17 10 - 40 U/L    AST 19 15 - 37 IU/L    Alkaline Phosphatase 149 (H) 40 - 128 IU/L    GFR Non-African American >60 >60 mL/min/1.73m2    GFR African American >60 >60 mL/min/1.73m2    Anion Gap 14 4 - 16   Lipase   Result Value Ref Range    Lipase 27 13 - 60 IU/L   Urinalysis   Result Value Ref Range    Color, UA STRAW (A) YELLOW    Clarity, UA CLEAR CLEAR    Glucose, Urine NEGATIVE NEGATIVE MG/DL    Bilirubin Urine NEGATIVE NEGATIVE MG/DL    Ketones, Urine NEGATIVE NEGATIVE MG/DL    Specific Gravity, UA 1.050 (H) 1.001 - 1.035    Blood, Urine MODERATE (A) NEGATIVE    pH, Urine 7.0 5.0 - 8.0    Protein, UA NEGATIVE NEGATIVE MG/DL    Urobilinogen, Urine NEGATIVE 0.2 - 1.0 MG/DL    Nitrite Urine, Quantitative NEGATIVE NEGATIVE    Leukocyte Esterase, Urine NEGATIVE NEGATIVE    RBC, UA <1 0 - 6 /HPF    WBC, UA <1 0 - 5 /HPF    Bacteria, UA NEGATIVE NEGATIVE /HPF    Squam Epithel, UA 4 /HPF    Trichomonas, UA NONE SEEN NONE SEEN /HPF    non squam epi cells <1 /HPF   HCG Serum, Qualitative   Result Value Ref Range    hCG Qual NEGATIVE    Troponin   Result Value Ref Range    Troponin T <0.010 <0.01 NG/ML   EKG 12 Lead   Result Value Ref Range    Ventricular Rate 66 BPM    Atrial Rate 66 BPM    P-R Interval 186 ms    QRS Duration 96 ms    Q-T Interval 424 ms    QTc Calculation (Bazett) 444 ms    P Axis 39 degrees    R Axis 12 degrees    T Axis 72 degrees    Diagnosis       Normal sinus rhythm  Cannot rule out Anterior infarct (cited on or before 20-AUG-2021)  Abnormal ECG  When compared with ECG of 20-AUG-2021 10:38,  Questionable change in initial forces of Anterior leads        Radiographs (if obtained):  Radiologist's Report Reviewed:  CT HEAD WO CONTRAST    Result Date: 9/16/2021  EXAMINATION: CT OF THE HEAD WITHOUT CONTRAST  9/16/2021 7:47 pm TECHNIQUE: CT of the head was performed without the administration of intravenous contrast. Dose modulation, iterative reconstruction, and/or weight based adjustment of the mA/kV was utilized to reduce the radiation dose to as low as reasonably achievable. COMPARISON: None. HISTORY: ORDERING SYSTEM PROVIDED HISTORY: pain after fall TECHNOLOGIST PROVIDED HISTORY: Reason for exam:->pain after fall Has a \"code stroke\" or \"stroke alert\" been called? ->No Decision Support Exception - unselect if not a suspected or confirmed emergency medical condition->Emergency Medical Condition (MA) Is the patient pregnant?->No Reason for Exam: fall, head/facial pain. Acuity: Acute Type of Exam: Initial Mechanism of Injury: fall, head/facial pain. Relevant Medical/Surgical History: none FINDINGS: BRAIN/VENTRICLES: There is no acute intracranial hemorrhage, mass effect or midline shift. No abnormal extra-axial fluid collection. The gray-white differentiation is maintained without evidence of an acute infarct. There is no evidence of hydrocephalus. ORBITS: The visualized portion of the orbits demonstrate no acute abnormality. SINUSES: The visualized paranasal sinuses and mastoid air cells demonstrate no acute abnormality. SOFT TISSUES/SKULL:  No acute abnormality of the visualized skull or soft tissues. No acute intracranial abnormality. If patient's symptoms are persistent or worsen, a follow-up head CT or MRI of the brain may be obtained. CT FACIAL BONES WO CONTRAST    Result Date: 9/16/2021  EXAMINATION: CT OF THE FACE WITHOUT CONTRAST  9/16/2021 7:48 pm TECHNIQUE: CT of the face was performed without the administration of intravenous contrast. Multiplanar reformatted images are provided for review. Dose modulation, iterative reconstruction, and/or weight based adjustment of the mA/kV was utilized to reduce the radiation dose to as low as reasonably achievable.  COMPARISON: None HISTORY: ORDERING SYSTEM PROVIDED HISTORY: pain after fall, abrasion/ecchimosis left max TECHNOLOGIST PROVIDED HISTORY: Reason for exam:->pain after fall, abrasion/ecchimosis left max Decision Support Exception - unselect if not a suspected or confirmed emergency medical condition->Emergency Medical Condition (MA) Is the patient pregnant?->No Reason for Exam: fall, head/facial pain. Acuity: Acute Type of Exam: Initial Mechanism of Injury: fall, head/facial pain. Relevant Medical/Surgical History: none FINDINGS: FACIAL BONES:  The maxilla, pterygoid plates and zygomatic arches are intact. The mandible is intact. The mandibular condyles are normally situated. The nasal bones and maxillary nasal processes are intact. ORBITS:  The globes appear intact. The extraocular muscles, optic nerve sheath complexes and lacrimal glands appear unremarkable. No retrobulbar hematoma or mass is seen. The orbital walls and rims are intact. SINUSES/MASTOIDS:  Minimal mucosal thickening in the inferior left maxillary sinus. The other paranasal sinuses and mastoid air cells are well aerated. No acute fracture is seen. SOFT TISSUES:  Mild soft tissue swelling noted over the left maxillary bone. No acute traumatic injury of the facial bones. Mild soft tissue swelling over the left maxilla. CT ABDOMEN PELVIS W IV CONTRAST Additional Contrast? None    Result Date: 9/16/2021  EXAMINATION: CT OF THE ABDOMEN AND PELVIS WITH CONTRAST 9/16/2021 7:49 pm TECHNIQUE: CT of the abdomen and pelvis was performed with the administration of intravenous contrast. Multiplanar reformatted images are provided for review. Dose modulation, iterative reconstruction, and/or weight based adjustment of the mA/kV was utilized to reduce the radiation dose to as low as reasonably achievable. COMPARISON: 08/20/2021 HISTORY: ORDERING SYSTEM PROVIDED HISTORY: pain left flank x1 year increased today TECHNOLOGIST PROVIDED HISTORY: Reason for exam:->pain left flank x1 year increased today Additional Contrast?->None Decision Support Exception - unselect if not a suspected or confirmed emergency medical condition->Emergency Medical Condition (MA) Reason for Exam: diffuse abd. pain.  Acuity: Acute Type of Exam: Initial Additional signs and symptoms: surg. hx; multiple hernia repairs Relevant Medical/Surgical History: 80 ml isovue 370 used. FINDINGS: Lower Chest: Mild cardiomegaly. Mild interstitial congestion versus minimal infiltrates. Organs: Mild diffuse hepatic steatosis noted. Tiny 5 mm cyst left hepatic dome. Liver is top-normal in size. Normal gallbladder, pancreas and spleen. Normal right adrenal and right kidney. No nephrolithiasis or hydronephrosis noted in the right kidney. Normal right ureter. Normal size left adrenal gland with mild haziness over the left adrenal gland. Marked hydronephrosis in the left kidney, with left renal edema and delayed functioning of left kidney. .  There is a left ureteropelvic junction stenosis present similar to prior comparison. The left ureter is not dilated or obstructed. GI/Bowel: Normal stomach, duodenum, jejunum and ileum. There is no small bowel obstruction. Normal appendix. No colonic obstruction or colitis. Few sigmoid diverticuli identified. Pelvis: Normal distal ureters and urinary bladder. Normal uterus. Normal ovaries with normal follicles. 3.4 cm complex cyst noted in the right ovary new from prior comparison. No free fluid in the pelvis. No significant pelvic adenopathy. No inguinal hernia. Peritoneum/Retroperitoneum: Abdominal aorta is normal in caliber with no evidence of aneurysmal dilatation or dissection. No retroperitoneal adenopathy or bleed. . Bones/Soft Tissues: Mild osteopenic changes and degenerative changes identified in the bony structures. .  Mild-to-moderate degenerative changes identified in the mid to lower thoracic endplates. 1. Severe left-sided hydronephrosis with obstruction due to a left ureteropelvic junction stenosis. Increased edema in the left kidney with delayed functioning of left kidney. Increased perinephric edema extending over the left adrenal.  No obstructing calculi.  2. Mild hepatic steatosis with a tiny hepatic cyst. 3. 3.4 cm signs of infection. CT abdomen pelvis shows UPJ obstruction with severe left hydronephrosis with increased edema in the left kidney with delayed functioning of the left kidney and increased perinephric edema extending over the left adrenal with no obstructing calculi. Patient was given pain control in the ED and patient continued be hypertensive with repeat blood pressure 192/96. Patient was given a dose of hydralazine in the ED and repeat blood pressure is pending. Did discuss with urology Dr. Sridevi Rondon who states he will see patient in the morning and consider stent placement. Patient agreeable to plan. Hospitalist was called patient was admitted to their service. Clinical Impression:  1. Ureteropelvic junction obstruction    2. Hydronephrosis of left kidney    3. Flank pain    4. Syncope and collapse    5. Closed head injury, initial encounter    6. Essential hypertension          Comment: Please note this report has been produced using speech recognition software and may contain errors related to that system including errors in grammar, punctuation, and spelling, as well as words and phrases that may be inappropriate. Efforts were made to edit the dictations.         Tierney Sullivan MD  09/16/21 0044

## 2021-09-17 NOTE — ED NOTES
Report called to Linda Faulkner RN for room [facility], [address] [facility]  09/16/21 [id number]

## 2021-09-17 NOTE — PROGRESS NOTES
54 yo female with atrophic left kidney 2nd likely left UPJ obstruction admitted to [facility] with left flank pain yesterday. Last seen in office by me 8/26/21. Plans were being laid for a cystoscopy, left retrograde pyelogram with diagnostic ureteroscopy and stent insertion. She was optimistic this would prove her left kidney had sufficient function (split function on lasix renal scan 3/21 was 14% left, 86% right). H/o smoking and gross hematuria. AF. Hypertensive. Cr 0.7. wbc 11.4    CT with left HN associated with atrophic left kidney. Urine PCR in office 8/26/21 with enterococcus pan sensitive. Results for Jessica Lenz (MRN [id number]) as of 9/17/2021 15:28   Ref. Range 9/16/2021 21:05   Color, UA Latest Ref Range: YELLOW  STRAW (A)   Clarity, UA Latest Ref Range: CLEAR  CLEAR   Bilirubin, Urine Latest Ref Range: NEGATIVE MG/DL NEGATIVE   Ketones, Urine Latest Ref Range: NEGATIVE MG/DL NEGATIVE   Specific Gravity, UA Latest Ref Range: 1.001 - 1.035  1.050 (H)   Blood, Urine Latest Ref Range: NEGATIVE  MODERATE (A)   Protein, UA Latest Ref Range: NEGATIVE MG/DL NEGATIVE   Urobilinogen, Urine Latest Ref Range: 0.2 - 1.0 MG/DL NEGATIVE   Leukocyte Esterase, Urine Latest Ref Range: NEGATIVE  NEGATIVE   Glucose, Urine Latest Ref Range: NEGATIVE MG/DL NEGATIVE   Nitrite Urine, Quantitative Latest Ref Range: NEGATIVE  NEGATIVE   pH, Urine Latest Ref Range: 5.0 - 8.0  7.0   WBC, UA Latest Ref Range: 0 - 5 /HPF <1   RBC, UA Latest Ref Range: 0 - 6 /HPF <1   Squam Epithel, UA Latest Units: /HPF 4   non squam epi cells Latest Units: /HPF <1   Bacteria, UA Latest Ref Range: NEGATIVE /HPF NEGATIVE   Trichomonas, UA Latest Ref Range: NONE SEEN /HPF NONE SEEN     NPO. Will arrange for cytsocopy with left renal aspiration for cytology with retrograde pyelogram and ureteral stent insertion to try to alleviate her discomfort.     Left flank initialed, all questions answered, consent signed, will proceed

## 2021-09-17 NOTE — H&P
History and Physical      Name:  Ab Root /Age/Sex: 1975  (55 y.o. female)   MRN & CSN:  [id number] & [id number] Admission Date/Time: 2021  5:39 PM   Location:  [facility]/ED[facility]12 PCP: Hadley Coronel 15 Day: 1    Assessment and Plan:   Ab Root is a 55 y.o.  female with history of hypertension, GERD and chronic tobacco abuse who presents with right-sided flank pain secondary to right UVJ stone hence the admission. Renal colic  -Known case of left hydronephrosis. -CT of the abdomen pelvis reveals severe left-sided hydronephrosis with obstruction due to left ureteropelvic junction stenosis. No stone is identified.  -Urology plans to place a stent in a.m.  -Continue IV fluids and empiric IV antibiotics. -Pain control with IV morphine. GERD  -Continue Protonix. Hypertension  -Continue home dose of metoprolol and lisinopril.  -Continue IV labetalol and hydralazine as needed for better control. Tobacco abuse  -NicoDerm patch while hospitalized and counseled against smoking. Diet Diet NPO   DVT Prophylaxis [] Lovenox, []  Heparin, [x] SCDs, [] Ambulation   GI Prophylaxis [x] PPI,  [] H2 Blocker,  [] Carafate,  [] Diet/Tube Feeds   Code Status Full Code   Disposition Patient requires continued admission due to flank pain   MDM [] Low, [] Moderate,[x]  High  Patient's risk as above due to renal colic     History of Present Illness:     Chief Complaint: Left flank pain  Ab Root is a 55 y.o.  female with history of hypertension, smoking and GERD who presents with left flank pain due to renal colic. Patient passed out secondary to the pain and hit the wall of house. She denies any chest pain or shortness of breath. She denies any dysuria hematuria melena hematochezia. She denies any fever chills or rigors. She is a known case of left-sided hydronephrosis secondary to left ureteropelvic junction stenosis.   In the ER her CT head and CT of the facial bone bones were unremarkable. CT abdomen pelvis did reveal severe hydronephrosis in the left side due to the stenosis as described above. Her serum chemistries were acceptable. Troponin T was less than 0.010. Liver function profile was within normal limits. Her hematology counts were within normal limits. Twelve-lead ECG showed normal sinus rhythm. Patient be admitted to the hospital service. Continue IV antibiotics. Continue IV fluids and pain control. Follow-up urology in a.m. Ten point ROS reviewed negative, unless as noted above    Objective:   No intake or output data in the 24 hours ending 09/16/21 2237   Vitals:   Vitals:    09/16/21 2202   BP: (!) 192/98   Pulse:    Resp:    Temp:    SpO2:      Physical Exam:   GEN Awake female, sitting upright in bed in no apparent distress. Appears given age. EYES Pupils are equally round. No scleral erythema, discharge, or conjunctivitis. HENT Mucous membranes are moist. Oral pharynx without exudates, no evidence of thrush. NECK Supple, no apparent thyromegaly or masses. RESP Clear to auscultation, no wheezes, rales or rhonchi. Symmetric chest movement while on room air. CARDIO/VASC S1/S2 auscultated. Regular rate without appreciable murmurs, rubs, or gallops. No JVD or carotid bruits. Peripheral pulses equal bilaterally and palpable. No peripheral edema. GI Abdomen is soft without significant tenderness, masses, or guarding. Bowel sounds are normoactive. Rectal exam deferred.  No costovertebral angle tenderness. Normal appearing external genitalia. Mao catheter is not present. HEME/LYMPH No palpable cervical lymphadenopathy and no hepatosplenomegaly. No petechiae or ecchymoses. MSK No gross joint deformities. SKIN Normal coloration, warm, dry. NEURO Cranial nerves appear grossly intact, normal speech, no lateralizing weakness. PSYCH Awake, alert, oriented x 4. Affect appropriate.     Past Medical History:      Past Medical History: Diagnosis Date    Asthma     last flare up 2016    Migraine     Last migraine 2015    Ovarian cyst     Rt side    Wears glasses      PSHX:  has a past surgical history that includes  section (, , , , ( with BPS)); Umbilical hernia repair (03/31/15); [place] tooth extraction; other surgical history (2015); Umbilical hernia repair (2017); hernia repair; and ventral hernia repair (2017). Allergies: Allergies   Allergen Reactions    Ciprofloxacin Hives       FAM HX: family history includes COPD in her mother; Diabetes in her mother; Early Death in her son; Emphysema in her sister; Hearing Loss in her daughter; High Blood Pressure in her mother, sister, and sister; Kidney Disease in her mother; Other in her mother. Soc HX:   Social History     Socioeconomic History    Marital status: Legally      Spouse name: None    Number of children: None    Years of education: None    Highest education level: None   Occupational History    None   Tobacco Use    Smoking status: Current Some Day Smoker     Packs/day: 0.50     Years: 8.00     Pack years: 4.00     Types: Cigarettes    Smokeless tobacco: Never Used   Substance and Sexual Activity    Alcohol use: Yes     Comment: occasional       CAFFEINE: 2 liter soda daily    Drug use: No    Sexual activity: Yes     Partners: Male   Other Topics Concern    None   Social History Narrative    None     Social Determinants of Health     Financial Resource Strain:     Difficulty of Paying Living Expenses:    Food Insecurity:     Worried About Running Out of Food in the Last Year:     Ran Out of Food in the Last Year:    Transportation Needs:     Lack of Transportation (Medical):      Lack of Transportation (Non-Medical):    Physical Activity:     Days of Exercise per Week:     Minutes of Exercise per Session:    Stress:     Feeling of Stress :    Social Connections:     Frequency of Communication with Friends and Family:     Frequency of Social Gatherings with Friends and Family:     Attends Hoahaoism Services:     Active Member of Clubs or Organizations:     Attends Club or Organization Meetings:     Marital Status:    Intimate Partner Violence:     Fear of Current or Ex-Partner:     Emotionally Abused:     Physically Abused:     Sexually Abused:        Data:   CBC with Differential:    Lab Results   Component Value Date    WBC 13.9 09/16/2021    RBC 4.46 09/16/2021    HGB 13.8 09/16/2021    HCT 42.3 09/16/2021     09/16/2021    MCV 94.8 09/16/2021    MCH 30.9 09/16/2021    MCHC 32.6 09/16/2021    RDW 12.5 09/16/2021    SEGSPCT 83.9 09/16/2021    LYMPHOPCT 11.3 09/16/2021    MONOPCT 4.0 09/16/2021    BASOPCT 0.4 09/16/2021    MONOSABS 0.6 09/16/2021    LYMPHSABS 1.6 09/16/2021    EOSABS 0.0 09/16/2021    BASOSABS 0.1 09/16/2021    DIFFTYPE AUTOMATED DIFFERENTIAL 09/16/2021       CMP:     Lab Results   Component Value Date     09/16/2021    K 4.2 09/16/2021     09/16/2021    CO2 22 09/16/2021    BUN 13 09/16/2021    CREATININE 0.9 09/16/2021    GFRAA >60 09/16/2021    LABGLOM >60 09/16/2021    GLUCOSE 118 09/16/2021    PROT 7.9 09/16/2021    PROT 7.4 02/28/2011    LABALBU 4.3 09/16/2021    CALCIUM 10.3 09/16/2021    BILITOT 0.3 09/16/2021    ALKPHOS 149 09/16/2021    AST 19 09/16/2021    ALT 17 09/16/2021       Troponin:  Lab Results   Component Value Date    TROPONINT <0.010 09/16/2021       U/A:    Lab Results   Component Value Date    COLORU STRAW 09/16/2021    PROTEINU NEGATIVE 09/16/2021    WBCUA <1 09/16/2021    RBCUA <1 09/16/2021    MUCUS RARE 12/23/2020    TRICHOMONAS NONE SEEN 09/16/2021    BACTERIA NEGATIVE 09/16/2021    CLARITYU CLEAR 09/16/2021    SPECGRAV 1.050 09/16/2021    LEUKOCYTESUR NEGATIVE 09/16/2021    UROBILINOGEN NEGATIVE 09/16/2021    BILIRUBINUR NEGATIVE 09/16/2021    BLOODU MODERATE 09/16/2021    AMORPHOUS RARE 09/12/2020       Urine Culture:  No components found for: KATIE    Radiology results:  CT ABDOMEN PELVIS W IV CONTRAST Additional Contrast? None   Final Result   1. Severe left-sided hydronephrosis with obstruction due to a left   ureteropelvic junction stenosis. Increased edema in the left kidney with   delayed functioning of left kidney. Increased perinephric edema extending   over the left adrenal.  No obstructing calculi. 2. Mild hepatic steatosis with a tiny hepatic cyst.   3. 3.4 cm complex cyst right ovary increased from prior comparison. Recommend prompt follow-up with pelvic US. Reference: J Am Rayna Radiol   2711;57:[id number]      RECOMMENDATIONS:   Recommend prompt follow-up with pelvic US. Reference: J Am Rayna Radiol 2013;10:675-681         CT FACIAL BONES WO CONTRAST   Final Result   No acute traumatic injury of the facial bones. Mild soft tissue swelling over the left maxilla. CT HEAD WO CONTRAST   Final Result   No acute intracranial abnormality. If patient's symptoms are persistent or worsen, a follow-up head CT or MRI of   the brain may be obtained. XR CHEST PORTABLE   Final Result   Top-normal cardiac size, minimal interstitial congestion suggested.                Medications:   Medications:    metoprolol tartrate  25 mg Oral BID    [START ON 9/17/2021] lisinopril  20 mg Oral Daily    sodium chloride flush  5-40 mL IntraVENous 2 times per day    [START ON 9/17/2021] nicotine  1 patch TransDERmal Daily    cefTRIAXone (ROCEPHIN) IV  1,000 mg IntraVENous Q24H    [START ON 9/17/2021] tamsulosin  0.4 mg Oral Daily    [START ON 9/17/2021] pantoprazole  40 mg IntraVENous Daily      Infusions:    sodium chloride      sodium chloride       PRN Meds: acetaminophen, 500 mg, Q6H PRN  albuterol sulfate HFA, 2 puff, Q6H PRN  hydrALAZINE, 10 mg, Q6H PRN  labetalol, 20 mg, Q4H PRN  sodium chloride flush, 5-40 mL, PRN  sodium chloride, 25 mL, PRN  ondansetron, 4 mg, Q8H PRN   Or  ondansetron, 4 mg, Q6H PRN  polyethylene glycol, 17 g, Daily PRN  acetaminophen, 650 mg, Q6H PRN   Or  acetaminophen, 650 mg, Q6H PRN  potassium chloride, 40 mEq, PRN   Or  potassium alternative oral replacement, 40 mEq, PRN   Or  potassium chloride, 10 mEq, PRN  magnesium sulfate, 2,000 mg, PRN          Electronically signed by Anshu Shetty MD on 9/16/2021 at 10:37 PM

## 2021-09-17 NOTE — PROGRESS NOTES
Hospitalist Progress Note      Name:  Psychiatricsumaya Lab /Age/Sex: 1975  (55 y.o. female)   MRN & CSN:  [id number] & [id number] Admission Date/Time: 2021  5:39 PM   Location:  [facility] PCP: Odalis Ba, Via Collactive Day: 2      Assessment and Plan:   Patient is a   years old     1. Left t UJ stone associated with hydronephrosis  2. Suspected acute urinary tract infection  3. History of GERD  4. Primary hypertension: Controlled  5. History of tobacco abuse    Plan:  Patient presented with abdominal pain and found to have a left UPJ stone associated with left hydronephrosis  UA has 5 urinary tract infection  Urine cultures pending: Continue Rocephin  Urology following and plans to pursue left ureteral stent placement  Continue current medication for chronic medical problems        DVT prophylaxis: Lovenox  CODE STATUS: Full support  Patient remains hospitalized due to: IV antibiotics, urology clearance      Subjective. :     Patient was seen and examined today. She continues to complain of abdominal pain. No acute events overnight. Remains afebrile still vital signs. Objective:   Vitals:   Vitals:    21 0938   BP: (!) 165/98   Pulse: 88   Resp: 16   Temp: 98.6 °F (37 °C)   SpO2: 94%         Physical Exam:   GEN: Awake, alert, in no apparent distress awake female, sitting upright in bed in no apparent distress. Appears given age. HEENT: Atraumatic, normocephalic, PERRLA, EOMI  NECK: Supple, no apparent thyromegaly or masses. RESP: Clear lungs to auscultation  CARDIO/VASC: Regular rate and rhythm, normal S1, S2, no murmurs  GI: Abdomen is soft, nontender, no significant organomegaly noted, bowel sounds present  MSK: No gross joint deformities.   Skin: No significant rashes, skin lesions noted  Neuro: AOx3, cranial nerves grossly intact, no focal motor or sensory deficits   PSYCH: Affect appropriate    Medications:   Medications:    cefTRIAXone (ROCEPHIN) IV  2,000 mg IntraVENous Q24H    metoprolol tartrate  25 mg Oral BID    lisinopril  20 mg Oral Daily    sodium chloride flush  5-40 mL IntraVENous 2 times per day    nicotine  1 patch TransDERmal Daily    pantoprazole  40 mg IntraVENous Daily      Infusions:    sodium chloride      sodium chloride 75 mL/hr at 09/16/21 2344     PRN Meds: labetalol (TRANDATE) IVPB, 20 mg, Q4H PRN  hydrALAZINE (APRESOLINE) ivpb, 10 mg, Q6H PRN  albuterol sulfate HFA, 2 puff, Q6H PRN  sodium chloride flush, 5-40 mL, PRN  sodium chloride, 25 mL, PRN  ondansetron, 4 mg, Q8H PRN   Or  ondansetron, 4 mg, Q6H PRN  polyethylene glycol, 17 g, Daily PRN  acetaminophen, 650 mg, Q6H PRN   Or  acetaminophen, 650 mg, Q6H PRN  potassium chloride, 40 mEq, PRN   Or  potassium alternative oral replacement, 40 mEq, PRN   Or  potassium chloride, 10 mEq, PRN  magnesium sulfate, 2,000 mg, PRN  morphine, 2 mg, Q4H PRN          Electronically signed by Edvin Mendez MD on 9/17/2021 at 10:22 AM

## 2021-09-17 NOTE — ANESTHESIA POSTPROCEDURE EVALUATION
Department of Anesthesiology  Postprocedure Note    Patient: Charlette Mojica  MRN: [id number]  YOB: 1975  Date of evaluation: 9/17/2021  Time:  5:18 PM     Procedure Summary     Date: 09/17/21 Room / Location: [place] [address] / [facility]    Anesthesia Start: 1636 Anesthesia Stop: 1718    Procedure: LEFT CYSTOSCOPY RETROGRADE PYELOGRAM STENT INSERTION (Left Ureter) Diagnosis: (LEFT URETERAL CALCULI)    Surgeons: Marjorie Steen MD Responsible Provider: Kate Lawrence DO    Anesthesia Type: MAC ASA Status: 2          Anesthesia Type: MAC    Arsalan Phase I:      Arsalan Phase II:      Last vitals: Reviewed and per EMR flowsheets.        Anesthesia Post Evaluation    Patient location during evaluation: bedside  Patient participation: complete - patient participated  Level of consciousness: awake and alert  Pain score: 0  Airway patency: patent  Nausea & Vomiting: no vomiting and no nausea  Complications: no  Cardiovascular status: blood pressure returned to baseline and hemodynamically stable  Respiratory status: acceptable, room air, spontaneous ventilation and nonlabored ventilation  Hydration status: stable

## 2021-09-17 NOTE — CONSULTS
smokeless tobacco.  ETOH:   reports current alcohol use. DRUGS:   reports no history of drug use. MARITAL STATUS:    OCCUPATION:      Family History:         Problem Relation Age of Onset    Diabetes Mother     Other Mother         thyroid    High Blood Pressure Mother     COPD Mother     Kidney Disease Mother     High Blood Pressure Sister     Emphysema Sister     High Blood Pressure Sister     Early Death Son     Hearing Loss Daughter        REVIEW OF SYSTEMS:        PHYSICAL EXAM:    VITALS:  BP (S) (!) 197/99 Comment: labetalol ivpb given by rn   Pulse 70   Temp 97.9 °F (36.6 °C)   Resp 16   Ht 5' 9\" (1.753 m)   Wt 237 lb (107.5 kg)   SpO2 94%   BMI 35.00 kg/m²     TEMPERATURE:  Current - Temp: 97.9 °F (36.6 °C); Max - Temp  Av.2 °F (36.8 °C)  Min: 97.9 °F (36.6 °C)  Max: 98.8 °F (37.1 °C)  24HR BLOOD PRESSURE RANGE:  Systolic (34XIJ), YZC:741 , Min:180 , WLD:280   ; Diastolic (22JVC), VJP:13, Min:88, Max:110    8HR INTAKE OUTPUT:  No intake/output data recorded.   URINARY CATHETER OUTPUT (Mao):     DRAIN/TUBE OUTPUT:        CONSTITUTIONAL:  awake, alert, cooperative, mild distress, and appears stated age  Soft, obese, nd/nt, benign      Data:    WBC:    Lab Results   Component Value Date    WBC 13.9 2021     Hemoglobin/Hematocrit:    Lab Results   Component Value Date    HGB 13.8 2021    HCT 42.3 2021     BMP:    Lab Results   Component Value Date     2021    K 4.2 2021     2021    CO2 22 2021    BUN 13 2021    LABALBU 4.3 2021    CREATININE 0.9 2021    CALCIUM 10.3 2021    GFRAA >60 2021    LABGLOM >60 2021     PT/INR:  No results found for: PROTIME, INR        Imaging: [unfilled]    CT Scan a/p 2021:  Increased left hydronephrosis- left UPJO, thinning left kidney, no stone      Impression:   Chronic Left UPJO with increased left hydronephrosis  Acute, persistent left flank pain and nausea      Recommendation:   1. Cystoscopy and left ureter stent placement today with possible left ureteroscopy  2. Obtain Covid test for pending surgery. She has not had vaccine  3. Continue Rocephin. Order urine culture   4. Hypertension:  Plans per primary team      Patient seen and examined, chart reviewed.      Electronically signed by Stefamparo Bates MD on 9/17/2021 at 7:02 AM

## 2021-09-17 NOTE — ANESTHESIA PRE PROCEDURE
Department of Anesthesiology  Preprocedure Note       Name:  Nicole Arreguin   Age:  55 y.o.  :  1975                                          MRN:  [id number]         Date:  2021      Surgeon: Mehrdad Lomeli):  Rebeca Puri MD    Procedure: Procedure(s):  LEFT CYSTOSCOPY RETROGRADE PYELOGRAM STENT INSERTION POSS URETEROSCOPY    Medications prior to admission:   Prior to Admission medications    Medication Sig Start Date End Date Taking?  Authorizing Provider   albuterol sulfate  (90 Base) MCG/ACT inhaler Inhale 2 puffs into the lungs every 6 hours as needed for Wheezing or Shortness of Breath (or cough) Please include spacer with instructions for use. 21   Annmarie Cho PA-C   naproxen (NAPROSYN) 500 MG tablet Take 1 tablet by mouth 2 times daily 21   Clark Alvarez PA-C   ondansetron (ZOFRAN ODT) 4 MG disintegrating tablet Take 1 tablet by mouth every 8 hours as needed for Nausea or Vomiting 21   Clark Alvarez PA-C   metoprolol tartrate (LOPRESSOR) 25 MG tablet Take 25 mg by mouth 2 times daily 20   Historical Provider, MD   ibuprofen (ADVIL) 200 MG tablet Take 2 tablets by mouth every 8 hours as needed for Pain 16  Tracy Gruber MD   acetaminophen (TYLENOL) 500 MG tablet Take 1,000 mg by mouth every 6 hours as needed for Pain    Historical Provider, MD   Acetaminophen-Caff-Pyrilamine (MIDOL COMPLETE PO) Take 2 tablets by mouth 2 times daily as needed     Historical Provider, MD       Current medications:    Current Facility-Administered Medications   Medication Dose Route Frequency Provider Last Rate Last Admin    labetalol (NORMODYNE;TRANDATE) 20 mg in sodium chloride 0.9 % 50 mL IVPB  20 mg IntraVENous Q4H PRN Balinda Estelline, APRN - NP   Stopped at 21 0804    hydrALAZINE (APRESOLINE) 10 mg in sodium chloride 0.9 % 50 mL ivpb  10 mg IntraVENous Q6H PRN Balinda Estelline, APRN - NP   Stopped at 21 0423    cefTRIAXone (ROCEPHIN) Linda Cisneros MD        pantoprazole (PROTONIX) injection 40 mg  40 mg IntraVENous Daily Armani Pickett MD   40 mg at 09/17/21 5466    morphine sulfate (PF) injection 2 mg  2 mg IntraVENous Q4H PRN Armani Pickett MD   2 mg at 09/17/21 0353       Allergies:     Allergies   Allergen Reactions    Ciprofloxacin Hives       Problem List:    Patient Active Problem List   Diagnosis Code    Renal colic on right side T62       Past Medical History:        Diagnosis Date    Asthma     last flare up 11/2016    Migraine     Last migraine 12/2015    Ovarian cyst     Rt side    Wears glasses        Past Surgical History:        Procedure Laterality Date   [place] [facility], 1991, 1996, 2000, 2006( with BPS)    x 5    HERNIA REPAIR      OTHER SURGICAL HISTORY  12 29 2015    robotic assist lap ventral hernia repair with mesh graft    UMBILICAL HERNIA REPAIR  03/31/15    laparoscopic with mesh    UMBILICAL HERNIA REPAIR  01/23/2017    Robotic    VENTRAL HERNIA REPAIR  01/23/2017    Robotic    WISDOM TOOTH EXTRACTION      3 of 4 extracted       Social History:    Social History     Tobacco Use    Smoking status: Current Some Day Smoker     Packs/day: 0.50     Years: 8.00     Pack years: 4.00     Types: Cigarettes    Smokeless tobacco: Never Used   Substance Use Topics    Alcohol use: Yes     Comment: occasional       CAFFEINE: 2 liter soda daily                                Ready to quit: Not Answered  Counseling given: Not Answered      Vital Signs (Current):   Vitals:    09/17/21 0301 09/17/21 0549 09/17/21 0822 09/17/21 0938   BP: (!) 180/93 (S) (!) 197/99  (!) 165/98   Pulse: 69 70  88   Resp: 18 16  16   Temp: 37.1 °C (98.8 °F) 36.6 °C (97.9 °F)  37 °C (98.6 °F)   TempSrc: Oral      SpO2: 94%  95% 94%   Weight:       Height:                                                  BP Readings from Last 3 Encounters:   09/17/21 (!) 165/98   08/20/21 (!) 172/124   02/12/21 132/78       NPO Status: Time of last liquid consumption: 2330                        Time of last solid consumption: 2330                        Date of last liquid consumption: 09/16/21                        Date of last solid food consumption: 09/16/21    BMI:   Wt Readings from Last 3 Encounters:   09/16/21 237 lb (107.5 kg)   08/20/21 237 lb (107.5 kg)   02/12/21 240 lb (108.9 kg)     Body mass index is 35 kg/m². CBC:   Lab Results   Component Value Date    WBC 11.4 09/17/2021    RBC 4.54 09/17/2021    HGB 14.4 09/17/2021    HCT 43.3 09/17/2021    MCV 95.4 09/17/2021    RDW 12.6 09/17/2021     09/17/2021       CMP:   Lab Results   Component Value Date     09/17/2021    K 4.3 09/17/2021    CL 96 09/17/2021    CO2 24 09/17/2021    BUN 9 09/17/2021    CREATININE 0.7 09/17/2021    GFRAA >60 09/17/2021    LABGLOM >60 09/17/2021    GLUCOSE 124 09/17/2021    PROT 7.3 09/17/2021    PROT 7.4 02/28/2011    CALCIUM 10.7 09/17/2021    BILITOT 0.4 09/17/2021    ALKPHOS 148 09/17/2021    AST 17 09/17/2021    ALT 16 09/17/2021       POC Tests: No results for input(s): POCGLU, POCNA, POCK, POCCL, POCBUN, POCHEMO, POCHCT in the last 72 hours.     Coags: No results found for: PROTIME, INR, APTT    HCG (If Applicable):   Lab Results   Component Value Date    PREGTESTUR NEGATIVE 01/23/2017        ABGs: No results found for: PHART, PO2ART, IXQ8ZFV, DUY9FZV, BEART, [id number]     Type & Screen (If Applicable):  No results found for: LABABO, LABRH    Drug/Infectious Status (If Applicable):  No results found for: HIV, HEPCAB    COVID-19 Screening (If Applicable): No results found for: COVID19        Anesthesia Evaluation  Patient summary reviewed no history of anesthetic complications:   Airway: Mallampati: II  TM distance: >3 FB   Neck ROM: limited  Mouth opening: > = 3 FB Dental:          Pulmonary:   (+) asthma: current smoker                           Cardiovascular:  Exercise tolerance: good (>4 METS),            Beta Blocker:  Not on Beta Blocker      ROS comment: Normal sinus rhythm   Cannot rule out Anterior infarct (cited on or before 20-AUG-2021)   Abnormal ECG   When compared with ECG of 20-AUG-2021 10:38,   Questionable change in initial forces of Anterior leads      Neuro/Psych:   (+) headaches: migraine headaches,             GI/Hepatic/Renal:   (+) renal disease: kidney stones,           Endo/Other: Negative Endo/Other ROS                    Abdominal:             Vascular: negative vascular ROS. Other Findings:           Anesthesia Plan      MAC     ASA 2       Induction: intravenous. MIPS: Postoperative opioids intended and Prophylactic antiemetics administered. Anesthetic plan and risks discussed with patient. Plan discussed with CRNA.               YOLANDA Leija - CRNA   9/17/2021

## 2021-09-17 NOTE — OP NOTE
Operative Note      Patient: Reva Denver  YOB: 1975  MRN: [id number]    Date of Procedure: 9/17/2021    Pre-Op Diagnosis: LEFt uretero-pelvic junction obstruction. Post-Op Diagnosis: Same       Procedure: cystoscopy, left retrograde pyelogram/renal aspirate for cytology/stent insertion    Surgeon(s):  Pablo Manjarrez MD    Anesthesia: General    Estimated Blood Loss (mL): Minimal    Complications: None    Specimens: left renal aspirate for cytology    Implants: none      Drains: none    Findings: moderate to severe left HN, no filling defects. Detailed Description of Procedure:   54 yo female with h/o left UPJO with 14% left renal function. Admitted to IM from ED with intractable colic. Npo for cystoscopy with left retrograde pyelogram/stent insertion. All questions answered. On scheduled Rocephin. Brought to the OR and placed in the supine position. MAC anesthesia administered. Placed in the supine position and sterilely prepped/draped. Time out taken by me to ensure proper operation/patient/side, all agreed. Cystoscopy ensued. Bladder drained/irrigated. Left retrograde pyelogram: severe left HN w/o any filling defects. Left renal pelvis aspirated for cytology. Left ureteral stent placed, 4.8 Icelandic variable length. Good position noted proximally fluoroscopically, distally cystoscopically. Bladder drained with cystoscope. Will repeat Lasix renal scan as outpatient. Anticipate will need a left simple nephrectomy. Retrograde pyelogram: contrast was injected into the left collecting system with findings of severe left HN w/o any filling defects noted.       Electronically signed by Pablo Manjarrez MD on 9/17/2021 at 3:29 PM

## 2021-09-18 VITALS
RESPIRATION RATE: 14 BRPM | OXYGEN SATURATION: 96 % | BODY MASS INDEX: 35.1 KG/M2 | WEIGHT: 237 LBS | TEMPERATURE: 97.7 F | SYSTOLIC BLOOD PRESSURE: 155 MMHG | DIASTOLIC BLOOD PRESSURE: 83 MMHG | HEIGHT: 69 IN | HEART RATE: 76 BPM

## 2021-09-18 LAB
CULTURE: NORMAL
Lab: NORMAL
SPECIMEN: NORMAL

## 2021-09-18 PROCEDURE — 6360000002 HC RX W HCPCS: Performed by: HOSPITALIST

## 2021-09-18 PROCEDURE — 6370000000 HC RX 637 (ALT 250 FOR IP): Performed by: HOSPITALIST

## 2021-09-18 PROCEDURE — 96376 TX/PRO/DX INJ SAME DRUG ADON: CPT

## 2021-09-18 PROCEDURE — 94761 N-INVAS EAR/PLS OXIMETRY MLT: CPT

## 2021-09-18 PROCEDURE — C9113 INJ PANTOPRAZOLE SODIUM, VIA: HCPCS | Performed by: HOSPITALIST

## 2021-09-18 PROCEDURE — G0378 HOSPITAL OBSERVATION PER HR: HCPCS

## 2021-09-18 RX ORDER — CEFUROXIME AXETIL 500 MG/1
500 TABLET ORAL 2 TIMES DAILY
Qty: 6 TABLET | Refills: 0 | Status: SHIPPED | OUTPATIENT
Start: 2021-09-18 | End: 2021-09-21

## 2021-09-18 RX ADMIN — LISINOPRIL 20 MG: 20 TABLET ORAL at 10:20

## 2021-09-18 RX ADMIN — METOPROLOL TARTRATE 25 MG: 50 TABLET, FILM COATED ORAL at 10:20

## 2021-09-18 RX ADMIN — PANTOPRAZOLE SODIUM 40 MG: 40 INJECTION, POWDER, FOR SOLUTION INTRAVENOUS at 10:20

## 2021-09-18 ASSESSMENT — PAIN SCALES - GENERAL
PAINLEVEL_OUTOF10: 1
PAINLEVEL_OUTOF10: 3
PAINLEVEL_OUTOF10: 0

## 2021-09-18 NOTE — DISCHARGE INSTR - DIET

## 2021-09-18 NOTE — DISCHARGE SUMMARY
Discharge Summary    Name:  Tim Bustamante /Age/Sex: 1975  (55 y.o. female)   MRN & CSN:  [id number] & [id number] Admission Date/Time: 2021  5:39 PM   Attending:  Greg Gupta MD Discharging Physician: Greg Gupta MD     Hospital Course:   Tim Bustamante is a 55 y.o.  female  who presents with <principal problem not specified>    Hospital Course. Patient is a 51-year-old female past medical history of hypertension, GERD, tobacco abuse who was admitted for abdominal pain secondary to left UPJ stone associated with hydronephrosis. Patient was started on Rocephin for suspected urinary tract infection. Patient was seen by urology and she underwent cystoscopy, left retrograde pyelogram, renal aspirate for cytology and stent insertion. Patient was then kept overnight for close observation. Patient symptoms have significantly improved now. Urology has recommended Ceftin 5 mg twice daily for 3 more days upon discharge. Since patient has been cleared for discharge with outpatient follow-up by urology and since patient has benefited the most from hospitalization, she will therefore be discharged back home in stable condition with outpatient follow with PCP and urology. The patient expressed appropriate understanding of and agreement with the discharge recommendations, medications, and plan.      Consults this admission:  IP CONSULT TO Beata Song 141 TO HOSPITALIST    Discharge Instruction:   Follow up appointments:   Primary care physician:  within 2 weeks    Diet:  regular diet   Activity: activity as tolerated  Disposition: Discharged to:   [x]Home, []C, []SNF, []Acute Rehab, []Hospice   Condition on discharge: Stable    Discharge Medications:      Ruthy Marti   Home Medication Instructions [id number]    Printed on:21 1003   Medication Information                      acetaminophen (TYLENOL) 500 MG tablet  Take 1,000 mg by mouth every 6 hours as needed for Pain Acetaminophen-Caff-Pyrilamine (MIDOL COMPLETE PO)  Take 2 tablets by mouth 2 times daily as needed              albuterol sulfate  (90 Base) MCG/ACT inhaler  Inhale 2 puffs into the lungs every 6 hours as needed for Wheezing or Shortness of Breath (or cough) Please include spacer with instructions for use. cefUROXime (CEFTIN) 500 MG tablet  Take 1 tablet by mouth 2 times daily for 3 days             metoprolol tartrate (LOPRESSOR) 25 MG tablet  Take 25 mg by mouth 2 times daily             naproxen (NAPROSYN) 500 MG tablet  Take 1 tablet by mouth 2 times daily             ondansetron (ZOFRAN ODT) 4 MG disintegrating tablet  Take 1 tablet by mouth every 8 hours as needed for Nausea or Vomiting                 Objective Findings at Discharge:   BP (!) 164/91   Pulse 81   Temp 98.3 °F (36.8 °C) (Oral)   Resp 16   Ht 5' 9\" (1.753 m)   Wt 237 lb (107.5 kg)   SpO2 95%   BMI 35.00 kg/m²            PHYSICAL EXAM   GEN Awake, Alert, in no apparent distress  HEENT Atraumatic, nomocephalic, PERRLA, EOMI  NECK Supple, no lymphadenopaty  RESP Clear lungs to auscultation bilaterally  CARDIO/VASC Normal S1/S2. RRRR. No mumurs. GI Abdomen is soft without significant tenderness, masses, or guarding. Bowel sounds +  MSK No gross joint deformities. SKIN Normal coloration, warm, dry. NEURO Cranial nerves appear grossly intact, normal speech, no lateralizing weakness. PSYCH Awake, alert, oriented x 3. Affect appropriate.     BMP/CBC  Recent Labs     09/16/21  1806 09/17/21  0701    133*   K 4.2 4.3    96*   CO2 22 24   BUN 13 9   CREATININE 0.9 0.7   WBC 13.9* 11.4*   HCT 42.3 43.3    268       IMAGING:      Discharge Time of 35 minutes    Electronically signed by Edvin Mendez MD on 9/18/2021 at 10:03 AM

## 2021-10-18 ENCOUNTER — HOSPITAL ENCOUNTER (OUTPATIENT)
Dept: NUCLEAR MEDICINE | Age: 46
Discharge: HOME | End: 2021-10-18
Payer: COMMERCIAL

## 2021-10-18 DIAGNOSIS — N13.30 HYDRONEPHROSIS, UNSPECIFIED HYDRONEPHROSIS TYPE: ICD-10-CM

## 2021-10-18 PROCEDURE — 6360000002 HC RX W HCPCS: Performed by: UROLOGY

## 2021-10-18 PROCEDURE — 78708 K FLOW/FUNCT IMAGE W/DRUG: CPT

## 2021-10-18 PROCEDURE — A9562 TC99M MERTIATIDE: HCPCS | Performed by: UROLOGY

## 2021-10-18 PROCEDURE — 3430000000 HC RX DIAGNOSTIC RADIOPHARMACEUTICAL: Performed by: UROLOGY

## 2021-10-18 RX ORDER — FUROSEMIDE 10 MG/ML
40 INJECTION INTRAMUSCULAR; INTRAVENOUS ONCE
Status: COMPLETED | OUTPATIENT
Start: 2021-10-18 | End: 2021-10-18

## 2021-10-18 RX ADMIN — Medication 5.1 MILLICURIE: at 10:15

## 2021-10-18 RX ADMIN — FUROSEMIDE 40 MG: 10 INJECTION, SOLUTION INTRAVENOUS at 10:35

## 2022-01-06 ENCOUNTER — OFFICE VISIT (OUTPATIENT)
Dept: SURGERY | Age: 47
End: 2022-01-06
Payer: COMMERCIAL

## 2022-01-06 VITALS
OXYGEN SATURATION: 100 % | DIASTOLIC BLOOD PRESSURE: 80 MMHG | WEIGHT: 228 LBS | SYSTOLIC BLOOD PRESSURE: 120 MMHG | HEART RATE: 76 BPM | BODY MASS INDEX: 33.67 KG/M2

## 2022-01-06 DIAGNOSIS — K43.2 INCISIONAL HERNIA, WITHOUT OBSTRUCTION OR GANGRENE: Primary | ICD-10-CM

## 2022-01-06 PROCEDURE — G8417 CALC BMI ABV UP PARAM F/U: HCPCS | Performed by: SURGERY

## 2022-01-06 PROCEDURE — 4004F PT TOBACCO SCREEN RCVD TLK: CPT | Performed by: SURGERY

## 2022-01-06 PROCEDURE — G8484 FLU IMMUNIZE NO ADMIN: HCPCS | Performed by: SURGERY

## 2022-01-06 PROCEDURE — G8427 DOCREV CUR MEDS BY ELIG CLIN: HCPCS | Performed by: SURGERY

## 2022-01-06 PROCEDURE — 99204 OFFICE O/P NEW MOD 45 MIN: CPT | Performed by: SURGERY

## 2022-01-06 ASSESSMENT — ENCOUNTER SYMPTOMS
SORE THROAT: 0
PHOTOPHOBIA: 0
APNEA: 0
CHOKING: 0
ANAL BLEEDING: 0
EYE ITCHING: 0
RECTAL PAIN: 0
STRIDOR: 0
BACK PAIN: 0
ABDOMINAL PAIN: 1
EYE REDNESS: 0
COLOR CHANGE: 0
CONSTIPATION: 0

## 2022-01-06 NOTE — PROGRESS NOTES
Chief Complaint   Patient presents with    Surgical Consult     abd hernia pp vent hernia 1/23/17       SUBJECTIVE:  HPI: Patient presents forevaluation of recurrent incisional hernia. Symptoms were first noted several weeks ago. Pain is progressive and worsening . Lump is reducible. Pt dnies nausea vomiting. Pt with previoushistory of abdominal surgery. I have reviewed the patient's(pertinent information to this visit) medical history, family history(scanned in  theMedia tab under \"patient questioner\"), social history and review of systems with the patient today in the office.        Past Surgical History:   Procedure Laterality Date    BLADDER SURGERY Left 9/17/2021    LEFT CYSTOSCOPY RETROGRADE PYELOGRAM STENT INSERTION performed by Gaylord Carrel, MD at [address] [place], [place] [address]vd, [address], [facility], 2000, 2006( with BPS)    x 5    HERNIA REPAIR      OTHER SURGICAL HISTORY  12 29 2015    robotic assist lap ventral hernia repair with mesh graft    UMBILICAL HERNIA REPAIR  03/31/15    laparoscopic with mesh    UMBILICAL HERNIA REPAIR  01/23/2017    Robotic    VENTRAL HERNIA REPAIR  01/23/2017    Robotic    WISDOM TOOTH EXTRACTION      3 of 4 extracted     Past Medical History:   Diagnosis Date    Asthma     last flare up 11/2016    Migraine     Last migraine 12/2015    Ovarian cyst     Rt side    Wears glasses         Family History   Problem Relation Age of Onset    Diabetes Mother     Other Mother         thyroid    High Blood Pressure Mother     COPD Mother     Kidney Disease Mother     High Blood Pressure Sister     Emphysema Sister     High Blood Pressure Sister     Early Death Son     Hearing Loss Daughter      Social History     Socioeconomic History    Marital status: Legally      Spouse name: Not on file    Number of children: Not on file    Years of education: Not on file    Highest education level: Not on file   Occupational History    Not on file   Tobacco Use    Smoking status: Current Some Day Smoker     Packs/day: 0.50     Years: 8.00     Pack years: 4.00     Types: Cigarettes    Smokeless tobacco: Never Used   Substance and Sexual Activity    Alcohol use: Yes     Comment: occasional       CAFFEINE: 2 liter soda daily    Drug use: No    Sexual activity: Yes     Partners: Male   Other Topics Concern    Not on file   Social History Narrative    Not on file     Social Determinants of Health     Financial Resource Strain:     Difficulty of Paying Living Expenses: Not on file   Food Insecurity:     Worried About Running Out of Food in the Last Year: Not on file    [place] of Food in the Last Year: Not on file   Transportation Needs:     Lack of Transportation (Medical): Not on file    Lack of Transportation (Non-Medical):  Not on file   Physical Activity:     Days of Exercise per Week: Not on file    Minutes of Exercise per Session: Not on file   Stress:     Feeling of Stress : Not on file   Social Connections:     Frequency of Communication with Friends and Family: Not on file    Frequency of Social Gatherings with Friends and Family: Not on file    Attends Orthodox Services: Not on file    Active Member of [address] [place] [address] or Organizations: Not on file    Attends Club or Organization Meetings: Not on file    Marital Status: Not on file   Intimate Partner Violence:     Fear of Current or Ex-Partner: Not on file    Emotionally Abused: Not on file    Physically Abused: Not on file    Sexually Abused: Not on file   Housing Stability:     Unable to Pay for Housing in the Last Year: Not on file    Number of [place] in the Last Year: Not on file    Unstable Housing in the Last Year: Not on file       Current Outpatient Medications   Medication Sig Dispense Refill    albuterol sulfate  (90 Base) MCG/ACT inhaler Inhale 2 puffs into the lungs every 6 hours as needed for Wheezing or Shortness of Breath (or cough) Please include spacer with instructions for Rhythm: Normal rate. Pulmonary:      Effort: Pulmonary effort is normal.   Abdominal:      General: There is no distension. Palpations: Abdomen is soft. There is no mass. Tenderness: There is abdominal tenderness. There is no guarding or rebound. Hernia: A hernia is present. Musculoskeletal:         General: Normal range of motion. Cervical back: Normal range of motion and neck supple. Skin:     General: Skin is warm. Neurological:      Mental Status: She is alert and oriented to person, place, and time. ASSESSMENT:  No diagnosis found. PLAN:  Treatment: Will discuss care with Dr Rosezena Nageotte as pt is scheduled to have left nephrectomy soon. We will attempt to repair the hernia at the same time as nephrectomy procedure. Patient counseled on risks, benefits, and alternatives of treatment plan at length. Patient states anunderstanding and willingness to proceed with plan. No orders of the defined types were placed in this encounter. No orders of the defined types were placed in this encounter. Follow Up:  No follow-ups on file.       Ezekiel Goyal MD

## 2022-02-18 ENCOUNTER — TELEPHONE (OUTPATIENT)
Dept: SURGERY | Age: 47
End: 2022-02-18

## 2022-02-18 NOTE — TELEPHONE ENCOUNTER
LEFT MESSAGE FOR Reji Grullon REGARDING SURGERY-KOBE Recurrent Incisional Hernia Repair SCHEDULED @ [facility]-.  NOTIFIED OF DATES, TIMES AND LOCATION    PHONE ASSESSMENT   COVID - 4/8/22 at 7:30  SURGERY - 4/15/22 at 11:00  P/O - 4/25/22 at 10:15    NPO AFTER MIDNIGHT  ( SENT

## 2022-02-21 ENCOUNTER — TELEPHONE (OUTPATIENT)
Dept: SURGERY | Age: 47
End: 2022-02-21

## 2022-02-21 NOTE — TELEPHONE ENCOUNTER
LEFT MESSAGE FOR PT TO SET UP APPT TO SIGN CONSENTS FOR SURGERY SCHEDULED 4/15/22.  ANY TIME BEFORE THEN

## 2022-03-03 ENCOUNTER — OFFICE VISIT (OUTPATIENT)
Dept: SURGERY | Age: 47
End: 2022-03-03

## 2022-03-03 DIAGNOSIS — K43.2 INCISIONAL HERNIA, WITHOUT OBSTRUCTION OR GANGRENE: Primary | ICD-10-CM

## 2022-03-03 PROCEDURE — 99999 PR OFFICE/OUTPT VISIT,PROCEDURE ONLY: CPT | Performed by: PHYSICIAN ASSISTANT

## 2022-03-03 NOTE — PROGRESS NOTES
General Surgery  Luis Gayle PA-C      Patient presents today to sign consent for her scheduled robotic assisted incisional hernia repair, which is a co-surgery with Dr. Bruon Carnes. Patient has no questions or concerns today. Denies any abdominal changes. Denies N/V or bowel changes. Abomen is soft and nondistended. A small midline incisional hernia is present. Surgery was discussed with the pt, and consent was signed.      Luis Gayle PA-C

## 2022-03-14 LAB
A/G RATIO: 1.5 (ref 1.1–2.2)
ALBUMIN SERPL-MCNC: 4.6 G/DL (ref 3.4–5)
ALP BLD-CCNC: 149 U/L (ref 40–129)
ALT SERPL-CCNC: 14 U/L (ref 10–40)
ANION GAP SERPL CALCULATED.3IONS-SCNC: 14 MMOL/L (ref 3–16)
AST SERPL-CCNC: 19 U/L (ref 15–37)
BILIRUB SERPL-MCNC: 0.3 MG/DL (ref 0–1)
BUN BLDV-MCNC: 16 MG/DL (ref 7–20)
CALCIUM SERPL-MCNC: 11.3 MG/DL (ref 8.3–10.6)
CHLORIDE BLD-SCNC: 105 MMOL/L (ref 99–110)
CO2: 25 MMOL/L (ref 21–32)
CREAT SERPL-MCNC: 1.1 MG/DL (ref 0.6–1.1)
GFR AFRICAN AMERICAN: >60
GFR NON-AFRICAN AMERICAN: 53
GLUCOSE BLD-MCNC: 109 MG/DL (ref 70–99)
PARATHYROID HORMONE INTACT: 87.9 PG/ML (ref 14–72)
POTASSIUM SERPL-SCNC: 4.5 MMOL/L (ref 3.5–5.1)
SODIUM BLD-SCNC: 144 MMOL/L (ref 136–145)
T4 FREE: 1.2 NG/DL (ref 0.9–1.8)
TOTAL PROTEIN: 7.6 G/DL (ref 6.4–8.2)
TSH SERPL DL<=0.05 MIU/L-ACNC: 1.15 UIU/ML (ref 0.27–4.2)
VITAMIN D 25-HYDROXY: 27.7 NG/ML

## 2022-04-01 ENCOUNTER — HOSPITAL ENCOUNTER (OUTPATIENT)
Dept: WOMENS IMAGING | Age: 47
Discharge: HOME | End: 2022-04-01
Payer: COMMERCIAL

## 2022-04-01 DIAGNOSIS — E21.0 PRIMARY HYPERPARATHYROIDISM (HCC): ICD-10-CM

## 2022-04-01 PROCEDURE — 77080 DXA BONE DENSITY AXIAL: CPT

## 2022-04-11 NOTE — PROGRESS NOTES
Patient will be called on 4/14/2022 with an arrival time for her surgery on 4/15/2022 at [facility]. 1. Do not eat or drink anything after midnight - unless instructed by your doctor prior to surgery. This includes                   no water, chewing gum or mints. 2. Follow your directions as prescribed by the doctor for your procedure and medications. 3. Check with your Doctor regarding stopping vitamins, supplements, blood thinners (Plavix, Coumadin, Lovenox, Effient, Pradaxa, Xarelto, Fragmin or                   other blood thinners) and follow their instructions. 4. Do not smoke, and do not drink any alcoholic beverages 24 hours prior to surgery. This includes NA Beer. 5. You may brush your teeth and gargle the morning of surgery. DO NOT SWALLOW WATER   6. You MUST make arrangements for a responsible adult to take you home after your surgery and be able to check on you every couple                   hours for the day. You will not be allowed to leave alone or drive yourself home. It is strongly suggested someone stay with you the first 24                   hrs. Your surgery will be cancelled if you do not have a ride home. 7. Please wear simple, loose fitting clothing to the hospital.  Christiano Lam not bring valuables (money, credit cards, checkbooks, etc.) Do not wear any                   makeup (including no eye makeup) or nail polish on your fingers or toes. 8. DO NOT wear any jewelry or piercings on day of surgery. All body piercing jewelry must be removed. 9. If you have dentures, they will be removed before going to the OR; we will provide you a container. If you wear contact lenses or glasses,                  they will be removed; please bring a case for them. 10. If you  have a Living Will and Durable Power of  for Healthcare, please bring in a copy.            11. Please bring picture ID,  insurance card, paperwork from the doctors office    (H & P, Consent, & card for implantable devices). 12. Take a shower the morning of your procedure with Hibiclens or an anti-bacterial soap. Do not apply any deodorant, lotion, oil or powder. Patient will take her lopressor the morning of her procedure ant bring her rescue inhaler with her that day.

## 2022-04-12 ENCOUNTER — HOSPITAL ENCOUNTER (OUTPATIENT)
Dept: NUCLEAR MEDICINE | Age: 47
Discharge: HOME | DRG: 443 | End: 2022-04-12
Payer: COMMERCIAL

## 2022-04-12 ENCOUNTER — HOSPITAL ENCOUNTER (OUTPATIENT)
Dept: ULTRASOUND IMAGING | Age: 47
Discharge: HOME | DRG: 443 | End: 2022-04-12
Payer: COMMERCIAL

## 2022-04-12 DIAGNOSIS — E21.0 PRIMARY HYPERPARATHYROIDISM (HCC): ICD-10-CM

## 2022-04-12 PROCEDURE — 76536 US EXAM OF HEAD AND NECK: CPT

## 2022-04-12 PROCEDURE — 78072 PARATHYRD PLANAR W/SPECT&CT: CPT

## 2022-04-12 PROCEDURE — A9500 TC99M SESTAMIBI: HCPCS | Performed by: INTERNAL MEDICINE

## 2022-04-12 PROCEDURE — 3430000000 HC RX DIAGNOSTIC RADIOPHARMACEUTICAL: Performed by: INTERNAL MEDICINE

## 2022-04-12 RX ADMIN — KIT FOR THE PREPARATION OF TECHNETIUM TC99M SESTAMIBI 20 MILLICURIE: 1 INJECTION, POWDER, LYOPHILIZED, FOR SOLUTION PARENTERAL at 12:50

## 2022-04-13 ENCOUNTER — HOSPITAL ENCOUNTER (OUTPATIENT)
Dept: GENERAL RADIOLOGY | Age: 47
Discharge: HOME | DRG: 443 | End: 2022-04-13
Payer: COMMERCIAL

## 2022-04-13 ENCOUNTER — HOSPITAL ENCOUNTER (OUTPATIENT)
Age: 47
Discharge: HOME | DRG: 443 | End: 2022-04-13
Payer: COMMERCIAL

## 2022-04-13 DIAGNOSIS — Z01.818 ENCOUNTER FOR PREADMISSION TESTING: ICD-10-CM

## 2022-04-13 DIAGNOSIS — Z01.818 PRE-OP TESTING: ICD-10-CM

## 2022-04-13 LAB
ALBUMIN SERPL-MCNC: 4.5 GM/DL (ref 3.4–5)
ALBUMIN SERPL-MCNC: 4.5 GM/DL (ref 3.4–5)
ALP BLD-CCNC: 149 IU/L (ref 40–129)
ALT SERPL-CCNC: 15 U/L (ref 10–40)
ANION GAP SERPL CALCULATED.3IONS-SCNC: 11 MMOL/L (ref 4–16)
APTT: 33 SECONDS (ref 25.1–37.1)
AST SERPL-CCNC: 16 IU/L (ref 15–37)
BACTERIA: ABNORMAL /HPF
BASOPHILS ABSOLUTE: 0.1 K/CU MM
BASOPHILS RELATIVE PERCENT: 0.6 % (ref 0–1)
BILIRUB SERPL-MCNC: 0.3 MG/DL (ref 0–1)
BILIRUBIN DIRECT: 0.2 MG/DL (ref 0–0.3)
BILIRUBIN URINE: NEGATIVE MG/DL
BILIRUBIN, INDIRECT: 0.1 MG/DL (ref 0–0.7)
BLOOD, URINE: ABNORMAL
BUN BLDV-MCNC: 10 MG/DL (ref 6–23)
CALCIUM OXALATE CRYSTALS: ABNORMAL /HPF
CALCIUM SERPL-MCNC: 10.6 MG/DL (ref 8.3–10.6)
CHLORIDE BLD-SCNC: 108 MMOL/L (ref 99–110)
CLARITY: CLEAR
CO2: 21 MMOL/L (ref 21–32)
COLOR: YELLOW
CREAT SERPL-MCNC: 0.8 MG/DL (ref 0.6–1.1)
DIFFERENTIAL TYPE: ABNORMAL
EOSINOPHILS ABSOLUTE: 0.1 K/CU MM
EOSINOPHILS RELATIVE PERCENT: 1.7 % (ref 0–3)
GFR AFRICAN AMERICAN: >60 ML/MIN/1.73M2
GFR NON-AFRICAN AMERICAN: >60 ML/MIN/1.73M2
GLUCOSE BLD-MCNC: 92 MG/DL (ref 70–99)
GLUCOSE, URINE: NEGATIVE MG/DL
HCT VFR BLD CALC: 41.3 % (ref 37–47)
HEMOGLOBIN: 13.7 GM/DL (ref 12.5–16)
IMMATURE NEUTROPHIL %: 0.4 % (ref 0–0.43)
INR BLD: 0.88 INDEX
KETONES, URINE: NEGATIVE MG/DL
LEUKOCYTE ESTERASE, URINE: ABNORMAL
LYMPHOCYTES ABSOLUTE: 2.3 K/CU MM
LYMPHOCYTES RELATIVE PERCENT: 27.7 % (ref 24–44)
MCH RBC QN AUTO: 31.6 PG (ref 27–31)
MCHC RBC AUTO-ENTMCNC: 33.2 % (ref 32–36)
MCV RBC AUTO: 95.4 FL (ref 78–100)
MONOCYTES ABSOLUTE: 0.5 K/CU MM
MONOCYTES RELATIVE PERCENT: 6.5 % (ref 0–4)
MUCUS: ABNORMAL HPF
NITRITE URINE, QUANTITATIVE: NEGATIVE
NUCLEATED RBC %: 0 %
PDW BLD-RTO: 13 % (ref 11.7–14.9)
PH, URINE: 6 (ref 5–8)
PHOSPHORUS: 2.4 MG/DL (ref 2.5–4.9)
PLATELET # BLD: 251 K/CU MM (ref 140–440)
PMV BLD AUTO: 11.5 FL (ref 7.5–11.1)
POTASSIUM SERPL-SCNC: 4.4 MMOL/L (ref 3.5–5.1)
PROTEIN UA: ABNORMAL MG/DL
PROTHROMBIN TIME: 11.3 SECONDS (ref 11.7–14.5)
RBC # BLD: 4.33 M/CU MM (ref 4.2–5.4)
RBC URINE: 12 /HPF (ref 0–6)
SEGMENTED NEUTROPHILS ABSOLUTE COUNT: 5.2 K/CU MM
SEGMENTED NEUTROPHILS RELATIVE PERCENT: 63.1 % (ref 36–66)
SODIUM BLD-SCNC: 140 MMOL/L (ref 135–145)
SPECIFIC GRAVITY UA: 1.02 (ref 1–1.03)
SQUAMOUS EPITHELIAL: 10 /HPF
TOTAL IMMATURE NEUTOROPHIL: 0.03 K/CU MM
TOTAL NUCLEATED RBC: 0 K/CU MM
TOTAL PROTEIN: 7 GM/DL (ref 6.4–8.2)
TRICHOMONAS: ABNORMAL /HPF
UROBILINOGEN, URINE: 0.2 MG/DL (ref 0.2–1)
WBC # BLD: 8.3 K/CU MM (ref 4–10.5)
WBC UA: 25 /HPF (ref 0–5)

## 2022-04-13 PROCEDURE — 86850 RBC ANTIBODY SCREEN: CPT

## 2022-04-13 PROCEDURE — 82248 BILIRUBIN DIRECT: CPT

## 2022-04-13 PROCEDURE — 81001 URINALYSIS AUTO W/SCOPE: CPT

## 2022-04-13 PROCEDURE — 86900 BLOOD TYPING SEROLOGIC ABO: CPT

## 2022-04-13 PROCEDURE — 86901 BLOOD TYPING SEROLOGIC RH(D): CPT

## 2022-04-13 PROCEDURE — 85730 THROMBOPLASTIN TIME PARTIAL: CPT

## 2022-04-13 PROCEDURE — 85025 COMPLETE CBC W/AUTO DIFF WBC: CPT

## 2022-04-13 PROCEDURE — 80053 COMPREHEN METABOLIC PANEL: CPT

## 2022-04-13 PROCEDURE — 87086 URINE CULTURE/COLONY COUNT: CPT

## 2022-04-13 PROCEDURE — 85610 PROTHROMBIN TIME: CPT

## 2022-04-13 PROCEDURE — 71046 X-RAY EXAM CHEST 2 VIEWS: CPT

## 2022-04-13 PROCEDURE — 84100 ASSAY OF PHOSPHORUS: CPT

## 2022-04-13 PROCEDURE — 86922 COMPATIBILITY TEST ANTIGLOB: CPT

## 2022-04-13 PROCEDURE — 36415 COLL VENOUS BLD VENIPUNCTURE: CPT

## 2022-04-13 NOTE — PROGRESS NOTES
04/13/2022 09:28 AM Phone (Luis A) Brenda Blum (Self) [phone] (H) Remove   I called and spoke to patient and informed her that the [place] [id number] had sent PAT orders for her to do. Patient agreed to come into [facility] on 4/13/22 before 1400 to complete labs, T&S (2 units PRBC's), UA and culture, and CXR (PA & Lat).       By Cole Zhao RN

## 2022-04-13 NOTE — PROGRESS NOTES
I called and spoke to patient and [facility] for her to do. Patient agreed to come into [facility] on 4/13/22 before 1400 to complete pre-testing  labs, T&S (2 units PRBC's), UA and culture, and CXR (PA & Lat).

## 2022-04-14 ENCOUNTER — ANESTHESIA EVENT (OUTPATIENT)
Dept: OPERATING ROOM | Age: 47
DRG: 443 | End: 2022-04-14
Payer: COMMERCIAL

## 2022-04-14 LAB
CULTURE: NORMAL
Lab: NORMAL
SPECIMEN: NORMAL

## 2022-04-14 NOTE — ANESTHESIA PRE PROCEDURE
Department of Anesthesiology  Preprocedure Note       Name:  Emil Doshi   Age:  55 y.o.  :  1975                                          MRN:  [id number]         Date:  2022      Surgeon: Nahun Dunaway): MD Osei Blanco MD    Procedure: Procedure(s):  LEFT NEPHRECTOMY LAPAROSCOPIC ROBOTIC  RECURRENT INCISIONAL HERNIA  REPAIR LAPAROSCOPIC ROBOTIC    Medications prior to admission:   Prior to Admission medications    Medication Sig Start Date End Date Taking? Authorizing Provider   Atorvastatin Calcium (LIPITOR PO) Take by mouth daily   Yes Historical Provider, MD   LISINOPRIL PO Take by mouth daily   Yes Historical Provider, MD   albuterol sulfate  (90 Base) MCG/ACT inhaler Inhale 2 puffs into the lungs every 6 hours as needed for Wheezing or Shortness of Breath (or cough) Please include spacer with instructions for use. 21   Juan Antonio Moss PA-C   metoprolol tartrate (LOPRESSOR) 25 MG tablet Take 25 mg by mouth 2 times daily 20   Historical Provider, MD   ibuprofen (ADVIL) 200 MG tablet Take 2 tablets by mouth every 8 hours as needed for Pain 16  Osei Ladd MD   acetaminophen (TYLENOL) 500 MG tablet Take 1,000 mg by mouth every 6 hours as needed for Pain    Historical Provider, MD       Current medications:    No current facility-administered medications for this encounter. Current Outpatient Medications   Medication Sig Dispense Refill    Atorvastatin Calcium (LIPITOR PO) Take by mouth daily      LISINOPRIL PO Take by mouth daily      albuterol sulfate  (90 Base) MCG/ACT inhaler Inhale 2 puffs into the lungs every 6 hours as needed for Wheezing or Shortness of Breath (or cough) Please include spacer with instructions for use.  1 Inhaler 0    metoprolol tartrate (LOPRESSOR) 25 MG tablet Take 25 mg by mouth 2 times daily      acetaminophen (TYLENOL) 500 MG tablet Take 1,000 mg by mouth every 6 hours as needed for Pain Allergies: Allergies   Allergen Reactions    Ciprofloxacin Hives       Problem List:    Patient Active Problem List   Diagnosis Code    Renal colic on right side U83       Past Medical History:        Diagnosis Date    Asthma     last flare up 11/2016    Hyperlipidemia     Hypertension     Kidney problem     left kidney functioning only 10 percent.     Migraine     Last migraine 12/2015    Ovarian cyst     Rt side    Wears glasses        Past Surgical History:        Procedure Laterality Date    BLADDER SURGERY Left 9/17/2021    LEFT CYSTOSCOPY RETROGRADE PYELOGRAM STENT INSERTION performed by Luis Reyes MD at [address] [place] [address], [address] [facility], 2006( with BPS)    x 5    HERNIA REPAIR      abdominal    OTHER SURGICAL HISTORY  12 29 2015    robotic assist lap ventral hernia repair with mesh graft    UMBILICAL HERNIA REPAIR  03/31/15    laparoscopic with mesh    UMBILICAL HERNIA REPAIR  01/23/2017    Robotic    VENTRAL HERNIA REPAIR  01/23/2017    Robotic    WISDOM TOOTH EXTRACTION      3 of 4 extracted       Social History:    Social History     Tobacco Use    Smoking status: Current Every Day Smoker     Packs/day: 0.50     Years: 8.00     Pack years: 4.00     Types: Cigarettes    Smokeless tobacco: Never Used   Substance Use Topics    Alcohol use: Not Currently     Comment: occasional       CAFFEINE: 2 liter soda daily                                Ready to quit: Not Answered  Counseling given: Not Answered      Vital Signs (Current):   Vitals:    04/11/22 1312   Weight: 227 lb (103 kg)   Height: 5' 8\" (1.727 m)                                              BP Readings from Last 3 Encounters:   01/06/22 120/80   09/18/21 (!) 155/83   09/17/21 110/68       NPO Status:                                                                                 BMI:   Wt Readings from Last 3 Encounters:   01/06/22 228 lb (103.4 kg)   09/16/21 237 lb (107.5 kg)   08/20/21 237 lb (107.5 kg)     Body mass index is 34.52 kg/m². CBC:   Lab Results   Component Value Date    WBC 8.3 04/13/2022    RBC 4.33 04/13/2022    HGB 13.7 04/13/2022    HCT 41.3 04/13/2022    MCV 95.4 04/13/2022    RDW 13.0 04/13/2022     04/13/2022       CMP:   Lab Results   Component Value Date     04/13/2022    K 4.4 04/13/2022     04/13/2022    CO2 21 04/13/2022    BUN 10 04/13/2022    CREATININE 0.8 04/13/2022    GFRAA >60 04/13/2022    AGRATIO 1.5 03/14/2022    LABGLOM >60 04/13/2022    GLUCOSE 92 04/13/2022    PROT 7.0 04/13/2022    PROT 7.4 02/28/2011    CALCIUM 10.6 04/13/2022    BILITOT 0.3 04/13/2022    ALKPHOS 149 04/13/2022    AST 16 04/13/2022    ALT 15 04/13/2022       POC Tests: No results for input(s): POCGLU, POCNA, POCK, POCCL, POCBUN, POCHEMO, POCHCT in the last 72 hours. Coags:   Lab Results   Component Value Date    PROTIME 11.3 04/13/2022    INR 0.88 04/13/2022    APTT 33.0 04/13/2022       HCG (If Applicable):   Lab Results   Component Value Date    PREGTESTUR NEGATIVE 09/17/2021        ABGs: No results found for: PHART, PO2ART, QFR2OHX, JES4LFD, BEART, U8LKRIHC     Type & Screen (If Applicable):  No results found for: LABABO, LABRH    Drug/Infectious Status (If Applicable):  No results found for: HIV, HEPCAB    COVID-19 Screening (If Applicable):   Lab Results   Component Value Date    COVID19 NOT DETECTED 09/17/2021           Anesthesia Evaluation    Airway: Mallampati: III  TM distance: >3 FB   Neck ROM: full  Mouth opening: < 3 FB Dental:          Pulmonary:   (+) asthma: current smoker                           Cardiovascular:  Exercise tolerance: good (>4 METS),   (+) hypertension:, hyperlipidemia         Beta Blocker:  Dose within 24 Hrs         Neuro/Psych:   (+) headaches: migraine headaches,             GI/Hepatic/Renal:   (+) renal disease: CRI, morbid obesity          Endo/Other:                     Abdominal:   (+) obese,           Vascular:           Other Findings:           Anesthesia Plan      general     ASA 2       Induction: intravenous. MIPS: Postoperative opioids intended and Prophylactic antiemetics administered. Anesthetic plan and risks discussed with patient. Pre Anesthesia Assessment complete. Chart reviewed on 4/14/2022        YOLANDA Shine - CRNA   4/14/2022    Pre Anesthesia Evaluation complete. Anesthesia plan, risks, benefits, alternatives, and personnel discussed with patient and/or legal guardian. Patient and/or legal guardian verbalized an understanding and agreed to proceed. Anesthesia plan discussed with care team members and agreed upon.   YOLANDA Shine CRNA  4/15/2022

## 2022-04-14 NOTE — PROGRESS NOTES
Called and confirmed with patient surgery at [facility]  on 4/15/22 at  0730 with an arrival time of 0600. Come into the Main entrance and check in. You can bring two people with you and wear a mask. Patient verbalized understanding.

## 2022-04-14 NOTE — PROGRESS NOTES
I called Dr. Jerod Garnett MD and was transferred to her assistants voicemail. I left a message stating that patient had pre-testing labs done on 4/13/22 and the abnormal results are: Alkphosphatase 149, phosphorus 2.4, Urinalysis is yellow, UA RBC's 12, UA WBC 25, few bacteria present, occasional mucus, and urine culture is still pending. I ask that Dr. Janine Thompson please be informed of results, patient surgery is scheduled for 4/15/22. I left my call back number for any questions.

## 2022-04-15 ENCOUNTER — HOSPITAL ENCOUNTER (INPATIENT)
Age: 47
LOS: 2 days | Discharge: HOME | DRG: 443 | End: 2022-04-17
Attending: SPECIALIST | Admitting: SPECIALIST
Payer: COMMERCIAL

## 2022-04-15 ENCOUNTER — ANESTHESIA (OUTPATIENT)
Dept: OPERATING ROOM | Age: 47
DRG: 443 | End: 2022-04-15
Payer: COMMERCIAL

## 2022-04-15 VITALS
TEMPERATURE: 95.4 F | RESPIRATION RATE: 1 BRPM | SYSTOLIC BLOOD PRESSURE: 123 MMHG | OXYGEN SATURATION: 99 % | DIASTOLIC BLOOD PRESSURE: 75 MMHG

## 2022-04-15 DIAGNOSIS — Z01.818 PRE-OP TESTING: ICD-10-CM

## 2022-04-15 DIAGNOSIS — Z01.818 ENCOUNTER FOR PREADMISSION TESTING: Primary | ICD-10-CM

## 2022-04-15 DIAGNOSIS — N13.0 HYDRONEPHROSIS WITH URETEROPELVIC JUNCTION OBSTRUCTION (CODE): ICD-10-CM

## 2022-04-15 PROBLEM — Q62.39 UPJ OBSTRUCTION, CONGENITAL: Status: ACTIVE | Noted: 2022-04-15

## 2022-04-15 LAB — PREGNANCY TEST URINE, POC: NEGATIVE

## 2022-04-15 PROCEDURE — 6360000002 HC RX W HCPCS: Performed by: ANESTHESIOLOGY

## 2022-04-15 PROCEDURE — 2709999900 HC NON-CHARGEABLE SUPPLY: Performed by: SPECIALIST

## 2022-04-15 PROCEDURE — 8E0W4CZ ROBOTIC ASSISTED PROCEDURE OF TRUNK REGION, PERCUTANEOUS ENDOSCOPIC APPROACH: ICD-10-PCS | Performed by: SPECIALIST

## 2022-04-15 PROCEDURE — 49656 PR LAP, RECURRENT INCISIONAL HERNIA REPAIR,REDUCIBLE: CPT | Performed by: SURGERY

## 2022-04-15 PROCEDURE — C1781 MESH (IMPLANTABLE): HCPCS | Performed by: SPECIALIST

## 2022-04-15 PROCEDURE — 0WUF0JZ SUPPLEMENT ABDOMINAL WALL WITH SYNTHETIC SUBSTITUTE, OPEN APPROACH: ICD-10-PCS | Performed by: SURGERY

## 2022-04-15 PROCEDURE — 2580000003 HC RX 258: Performed by: SPECIALIST

## 2022-04-15 PROCEDURE — 6370000000 HC RX 637 (ALT 250 FOR IP): Performed by: SPECIALIST

## 2022-04-15 PROCEDURE — 6360000002 HC RX W HCPCS: Performed by: SPECIALIST

## 2022-04-15 PROCEDURE — 3600000009 HC SURGERY ROBOT BASE: Performed by: SPECIALIST

## 2022-04-15 PROCEDURE — S2900 ROBOTIC SURGICAL SYSTEM: HCPCS | Performed by: SPECIALIST

## 2022-04-15 PROCEDURE — 0KNK0ZZ RELEASE RIGHT ABDOMEN MUSCLE, OPEN APPROACH: ICD-10-PCS | Performed by: SURGERY

## 2022-04-15 PROCEDURE — 0KNL0ZZ RELEASE LEFT ABDOMEN MUSCLE, OPEN APPROACH: ICD-10-PCS | Performed by: SURGERY

## 2022-04-15 PROCEDURE — 3700000000 HC ANESTHESIA ATTENDED CARE: Performed by: SPECIALIST

## 2022-04-15 PROCEDURE — 2500000003 HC RX 250 WO HCPCS: Performed by: SPECIALIST

## 2022-04-15 PROCEDURE — 2580000003 HC RX 258: Performed by: ANESTHESIOLOGY

## 2022-04-15 PROCEDURE — 0TP94DZ REMOVAL OF INTRALUMINAL DEVICE FROM URETER, PERCUTANEOUS ENDOSCOPIC APPROACH: ICD-10-PCS | Performed by: SPECIALIST

## 2022-04-15 PROCEDURE — 2580000003 HC RX 258: Performed by: NURSE ANESTHETIST, CERTIFIED REGISTERED

## 2022-04-15 PROCEDURE — 0TT14ZZ RESECTION OF LEFT KIDNEY, PERCUTANEOUS ENDOSCOPIC APPROACH: ICD-10-PCS | Performed by: SPECIALIST

## 2022-04-15 PROCEDURE — 88307 TISSUE EXAM BY PATHOLOGIST: CPT

## 2022-04-15 PROCEDURE — G0378 HOSPITAL OBSERVATION PER HR: HCPCS

## 2022-04-15 PROCEDURE — 0DN84ZZ RELEASE SMALL INTESTINE, PERCUTANEOUS ENDOSCOPIC APPROACH: ICD-10-PCS | Performed by: SURGERY

## 2022-04-15 PROCEDURE — 6360000002 HC RX W HCPCS: Performed by: NURSE ANESTHETIST, CERTIFIED REGISTERED

## 2022-04-15 PROCEDURE — 2720000010 HC SURG SUPPLY STERILE: Performed by: SPECIALIST

## 2022-04-15 PROCEDURE — 15734 MUSCLE-SKIN GRAFT TRUNK: CPT | Performed by: SURGERY

## 2022-04-15 PROCEDURE — APPNB180 APP NON BILLABLE TIME > 60 MINS: Performed by: PHYSICIAN ASSISTANT

## 2022-04-15 PROCEDURE — 7100000000 HC PACU RECOVERY - FIRST 15 MIN: Performed by: SPECIALIST

## 2022-04-15 PROCEDURE — 49656 PR LAP, RECURRENT INCISIONAL HERNIA REPAIR,REDUCIBLE: CPT | Performed by: PHYSICIAN ASSISTANT

## 2022-04-15 PROCEDURE — 3700000001 HC ADD 15 MINUTES (ANESTHESIA): Performed by: SPECIALIST

## 2022-04-15 PROCEDURE — 1200000000 HC SEMI PRIVATE

## 2022-04-15 PROCEDURE — 81025 URINE PREGNANCY TEST: CPT

## 2022-04-15 PROCEDURE — 3600000019 HC SURGERY ROBOT ADDTL 15MIN: Performed by: SPECIALIST

## 2022-04-15 PROCEDURE — 7100000001 HC PACU RECOVERY - ADDTL 15 MIN: Performed by: SPECIALIST

## 2022-04-15 PROCEDURE — 2500000003 HC RX 250 WO HCPCS: Performed by: NURSE ANESTHETIST, CERTIFIED REGISTERED

## 2022-04-15 DEVICE — MESH SURG W8X15CM DIA5MM POLYPR RECT FLAT FOR SFT TISS REP: Type: IMPLANTABLE DEVICE | Site: ABDOMEN | Status: FUNCTIONAL

## 2022-04-15 RX ORDER — FENTANYL CITRATE 50 UG/ML
INJECTION, SOLUTION INTRAMUSCULAR; INTRAVENOUS PRN
Status: DISCONTINUED | OUTPATIENT
Start: 2022-04-15 | End: 2022-04-15 | Stop reason: SDUPTHER

## 2022-04-15 RX ORDER — HYDRALAZINE HYDROCHLORIDE 20 MG/ML
10 INJECTION INTRAMUSCULAR; INTRAVENOUS
Status: DISCONTINUED | OUTPATIENT
Start: 2022-04-15 | End: 2022-04-15 | Stop reason: HOSPADM

## 2022-04-15 RX ORDER — MEPERIDINE HYDROCHLORIDE 25 MG/ML
12.5 INJECTION INTRAMUSCULAR; INTRAVENOUS; SUBCUTANEOUS EVERY 5 MIN PRN
Status: DISCONTINUED | OUTPATIENT
Start: 2022-04-15 | End: 2022-04-15 | Stop reason: HOSPADM

## 2022-04-15 RX ORDER — SODIUM CHLORIDE 9 MG/ML
25 INJECTION, SOLUTION INTRAVENOUS PRN
Status: DISCONTINUED | OUTPATIENT
Start: 2022-04-15 | End: 2022-04-15 | Stop reason: HOSPADM

## 2022-04-15 RX ORDER — ONDANSETRON 2 MG/ML
INJECTION INTRAMUSCULAR; INTRAVENOUS PRN
Status: DISCONTINUED | OUTPATIENT
Start: 2022-04-15 | End: 2022-04-15 | Stop reason: SDUPTHER

## 2022-04-15 RX ORDER — DEXAMETHASONE SODIUM PHOSPHATE 4 MG/ML
INJECTION, SOLUTION INTRA-ARTICULAR; INTRALESIONAL; INTRAMUSCULAR; INTRAVENOUS; SOFT TISSUE PRN
Status: DISCONTINUED | OUTPATIENT
Start: 2022-04-15 | End: 2022-04-15 | Stop reason: SDUPTHER

## 2022-04-15 RX ORDER — DIPHENHYDRAMINE HYDROCHLORIDE 50 MG/ML
12.5 INJECTION INTRAMUSCULAR; INTRAVENOUS
Status: DISCONTINUED | OUTPATIENT
Start: 2022-04-15 | End: 2022-04-15 | Stop reason: HOSPADM

## 2022-04-15 RX ORDER — ALBUTEROL SULFATE 90 UG/1
2 AEROSOL, METERED RESPIRATORY (INHALATION) EVERY 6 HOURS PRN
Status: DISCONTINUED | OUTPATIENT
Start: 2022-04-15 | End: 2022-04-17 | Stop reason: HOSPADM

## 2022-04-15 RX ORDER — SODIUM CHLORIDE 0.9 % (FLUSH) 0.9 %
5-40 SYRINGE (ML) INJECTION PRN
Status: DISCONTINUED | OUTPATIENT
Start: 2022-04-15 | End: 2022-04-15 | Stop reason: HOSPADM

## 2022-04-15 RX ORDER — MIDAZOLAM HYDROCHLORIDE 2 MG/2ML
2 INJECTION, SOLUTION INTRAMUSCULAR; INTRAVENOUS
Status: DISCONTINUED | OUTPATIENT
Start: 2022-04-15 | End: 2022-04-15 | Stop reason: HOSPADM

## 2022-04-15 RX ORDER — PROPOFOL 10 MG/ML
INJECTION, EMULSION INTRAVENOUS PRN
Status: DISCONTINUED | OUTPATIENT
Start: 2022-04-15 | End: 2022-04-15 | Stop reason: SDUPTHER

## 2022-04-15 RX ORDER — ARIPIPRAZOLE 10 MG/1
10 TABLET ORAL DAILY
COMMUNITY

## 2022-04-15 RX ORDER — BUPIVACAINE HYDROCHLORIDE 5 MG/ML
INJECTION, SOLUTION EPIDURAL; INTRACAUDAL
Status: COMPLETED | OUTPATIENT
Start: 2022-04-15 | End: 2022-04-15

## 2022-04-15 RX ORDER — SODIUM CHLORIDE 0.9 % (FLUSH) 0.9 %
5-40 SYRINGE (ML) INJECTION EVERY 12 HOURS SCHEDULED
Status: DISCONTINUED | OUTPATIENT
Start: 2022-04-15 | End: 2022-04-15 | Stop reason: HOSPADM

## 2022-04-15 RX ORDER — SODIUM CHLORIDE 0.9 % (FLUSH) 0.9 %
5-40 SYRINGE (ML) INJECTION PRN
Status: DISCONTINUED | OUTPATIENT
Start: 2022-04-15 | End: 2022-04-17 | Stop reason: HOSPADM

## 2022-04-15 RX ORDER — EPHEDRINE SULFATE 50 MG/ML
INJECTION INTRAVENOUS PRN
Status: DISCONTINUED | OUTPATIENT
Start: 2022-04-15 | End: 2022-04-15 | Stop reason: SDUPTHER

## 2022-04-15 RX ORDER — CEFAZOLIN SODIUM 2 G/100ML
2000 INJECTION, SOLUTION INTRAVENOUS ONCE
Status: COMPLETED | OUTPATIENT
Start: 2022-04-15 | End: 2022-04-15

## 2022-04-15 RX ORDER — DEXTROSE AND SODIUM CHLORIDE 5; .45 G/100ML; G/100ML
INJECTION, SOLUTION INTRAVENOUS CONTINUOUS
Status: DISCONTINUED | OUTPATIENT
Start: 2022-04-15 | End: 2022-04-17 | Stop reason: HOSPADM

## 2022-04-15 RX ORDER — SODIUM CHLORIDE, SODIUM LACTATE, POTASSIUM CHLORIDE, CALCIUM CHLORIDE 600; 310; 30; 20 MG/100ML; MG/100ML; MG/100ML; MG/100ML
INJECTION, SOLUTION INTRAVENOUS CONTINUOUS PRN
Status: DISCONTINUED | OUTPATIENT
Start: 2022-04-15 | End: 2022-04-15 | Stop reason: SDUPTHER

## 2022-04-15 RX ORDER — ACETAMINOPHEN 325 MG/1
650 TABLET ORAL EVERY 6 HOURS
Status: DISCONTINUED | OUTPATIENT
Start: 2022-04-15 | End: 2022-04-17 | Stop reason: HOSPADM

## 2022-04-15 RX ORDER — SODIUM CHLORIDE 0.9 % (FLUSH) 0.9 %
5-40 SYRINGE (ML) INJECTION EVERY 12 HOURS SCHEDULED
Status: DISCONTINUED | OUTPATIENT
Start: 2022-04-15 | End: 2022-04-17 | Stop reason: HOSPADM

## 2022-04-15 RX ORDER — TRAMADOL HYDROCHLORIDE 50 MG/1
50 TABLET ORAL EVERY 6 HOURS PRN
Status: DISCONTINUED | OUTPATIENT
Start: 2022-04-15 | End: 2022-04-17 | Stop reason: HOSPADM

## 2022-04-15 RX ORDER — POTASSIUM CHLORIDE 7.45 MG/ML
10 INJECTION INTRAVENOUS PRN
Status: DISCONTINUED | OUTPATIENT
Start: 2022-04-15 | End: 2022-04-17 | Stop reason: HOSPADM

## 2022-04-15 RX ORDER — ONDANSETRON 4 MG/1
4 TABLET, ORALLY DISINTEGRATING ORAL EVERY 8 HOURS PRN
Status: DISCONTINUED | OUTPATIENT
Start: 2022-04-15 | End: 2022-04-17 | Stop reason: HOSPADM

## 2022-04-15 RX ORDER — TRAMADOL HYDROCHLORIDE 50 MG/1
100 TABLET ORAL EVERY 6 HOURS PRN
Status: DISCONTINUED | OUTPATIENT
Start: 2022-04-15 | End: 2022-04-17 | Stop reason: HOSPADM

## 2022-04-15 RX ORDER — ONDANSETRON 2 MG/ML
4 INJECTION INTRAMUSCULAR; INTRAVENOUS EVERY 6 HOURS PRN
Status: DISCONTINUED | OUTPATIENT
Start: 2022-04-15 | End: 2022-04-17 | Stop reason: HOSPADM

## 2022-04-15 RX ORDER — LIDOCAINE HYDROCHLORIDE 20 MG/ML
INJECTION, SOLUTION INTRAVENOUS PRN
Status: DISCONTINUED | OUTPATIENT
Start: 2022-04-15 | End: 2022-04-15 | Stop reason: SDUPTHER

## 2022-04-15 RX ORDER — POTASSIUM CHLORIDE 20 MEQ/1
40 TABLET, EXTENDED RELEASE ORAL PRN
Status: DISCONTINUED | OUTPATIENT
Start: 2022-04-15 | End: 2022-04-17 | Stop reason: HOSPADM

## 2022-04-15 RX ORDER — SODIUM CHLORIDE, SODIUM LACTATE, POTASSIUM CHLORIDE, CALCIUM CHLORIDE 600; 310; 30; 20 MG/100ML; MG/100ML; MG/100ML; MG/100ML
INJECTION, SOLUTION INTRAVENOUS CONTINUOUS
Status: DISCONTINUED | OUTPATIENT
Start: 2022-04-15 | End: 2022-04-15

## 2022-04-15 RX ORDER — SODIUM CHLORIDE 9 MG/ML
25 INJECTION, SOLUTION INTRAVENOUS PRN
Status: DISCONTINUED | OUTPATIENT
Start: 2022-04-15 | End: 2022-04-17 | Stop reason: HOSPADM

## 2022-04-15 RX ORDER — OXYCODONE HYDROCHLORIDE 5 MG/1
5 TABLET ORAL
Status: DISCONTINUED | OUTPATIENT
Start: 2022-04-15 | End: 2022-04-15 | Stop reason: HOSPADM

## 2022-04-15 RX ORDER — FENTANYL CITRATE 50 UG/ML
50 INJECTION, SOLUTION INTRAMUSCULAR; INTRAVENOUS EVERY 5 MIN PRN
Status: DISCONTINUED | OUTPATIENT
Start: 2022-04-15 | End: 2022-04-15 | Stop reason: HOSPADM

## 2022-04-15 RX ORDER — SODIUM CHLORIDE 9 MG/ML
INJECTION, SOLUTION INTRAVENOUS CONTINUOUS PRN
Status: DISCONTINUED | OUTPATIENT
Start: 2022-04-15 | End: 2022-04-15 | Stop reason: SDUPTHER

## 2022-04-15 RX ORDER — ARIPIPRAZOLE 10 MG/1
10 TABLET ORAL DAILY
Status: DISCONTINUED | OUTPATIENT
Start: 2022-04-15 | End: 2022-04-17 | Stop reason: HOSPADM

## 2022-04-15 RX ORDER — ONDANSETRON 2 MG/ML
4 INJECTION INTRAMUSCULAR; INTRAVENOUS
Status: DISCONTINUED | OUTPATIENT
Start: 2022-04-15 | End: 2022-04-15 | Stop reason: HOSPADM

## 2022-04-15 RX ORDER — ROCURONIUM BROMIDE 10 MG/ML
INJECTION, SOLUTION INTRAVENOUS PRN
Status: DISCONTINUED | OUTPATIENT
Start: 2022-04-15 | End: 2022-04-15 | Stop reason: SDUPTHER

## 2022-04-15 RX ADMIN — HYDROMORPHONE HYDROCHLORIDE 0.25 MG: 1 INJECTION, SOLUTION INTRAMUSCULAR; INTRAVENOUS; SUBCUTANEOUS at 13:17

## 2022-04-15 RX ADMIN — METOPROLOL TARTRATE 25 MG: 25 TABLET, FILM COATED ORAL at 20:08

## 2022-04-15 RX ADMIN — SODIUM CHLORIDE: 9 INJECTION, SOLUTION INTRAVENOUS at 07:50

## 2022-04-15 RX ADMIN — CEFAZOLIN SODIUM 2000 MG: 2 INJECTION, SOLUTION INTRAVENOUS at 11:30

## 2022-04-15 RX ADMIN — SODIUM CHLORIDE, POTASSIUM CHLORIDE, SODIUM LACTATE AND CALCIUM CHLORIDE: 600; 310; 30; 20 INJECTION, SOLUTION INTRAVENOUS at 06:51

## 2022-04-15 RX ADMIN — ACETAMINOPHEN 650 MG: 325 TABLET ORAL at 21:04

## 2022-04-15 RX ADMIN — SODIUM CHLORIDE: 9 INJECTION, SOLUTION INTRAVENOUS at 11:23

## 2022-04-15 RX ADMIN — HYDROMORPHONE HYDROCHLORIDE 0.5 MG: 1 INJECTION, SOLUTION INTRAMUSCULAR; INTRAVENOUS; SUBCUTANEOUS at 12:26

## 2022-04-15 RX ADMIN — BISACODYL 5 MG: 5 TABLET, COATED ORAL at 16:41

## 2022-04-15 RX ADMIN — ROCURONIUM BROMIDE 50 MG: 10 SOLUTION INTRAVENOUS at 07:36

## 2022-04-15 RX ADMIN — EPHEDRINE SULFATE 10 MG: 50 INJECTION INTRAVENOUS at 07:52

## 2022-04-15 RX ADMIN — TRAMADOL HYDROCHLORIDE 50 MG: 50 TABLET, COATED ORAL at 22:43

## 2022-04-15 RX ADMIN — PROPOFOL 150 MG: 10 INJECTION, EMULSION INTRAVENOUS at 07:35

## 2022-04-15 RX ADMIN — FENTANYL CITRATE 50 MCG: 50 INJECTION, SOLUTION INTRAMUSCULAR; INTRAVENOUS at 13:04

## 2022-04-15 RX ADMIN — ROCURONIUM BROMIDE 30 MG: 10 SOLUTION INTRAVENOUS at 11:07

## 2022-04-15 RX ADMIN — SODIUM CHLORIDE, POTASSIUM CHLORIDE, SODIUM LACTATE AND CALCIUM CHLORIDE: 600; 310; 30; 20 INJECTION, SOLUTION INTRAVENOUS at 07:29

## 2022-04-15 RX ADMIN — ROCURONIUM BROMIDE 30 MG: 10 SOLUTION INTRAVENOUS at 09:06

## 2022-04-15 RX ADMIN — DEXAMETHASONE SODIUM PHOSPHATE 8 MG: 4 INJECTION, SOLUTION INTRAMUSCULAR; INTRAVENOUS at 07:55

## 2022-04-15 RX ADMIN — SODIUM CHLORIDE, PRESERVATIVE FREE 10 ML: 5 INJECTION INTRAVENOUS at 20:08

## 2022-04-15 RX ADMIN — DEXTROSE AND SODIUM CHLORIDE: 5; 450 INJECTION, SOLUTION INTRAVENOUS at 13:36

## 2022-04-15 RX ADMIN — ROCURONIUM BROMIDE 30 MG: 10 SOLUTION INTRAVENOUS at 08:10

## 2022-04-15 RX ADMIN — LIDOCAINE HYDROCHLORIDE 50 MG: 20 INJECTION, SOLUTION INTRAVENOUS at 12:11

## 2022-04-15 RX ADMIN — SUGAMMADEX 300 MG: 100 INJECTION, SOLUTION INTRAVENOUS at 12:15

## 2022-04-15 RX ADMIN — LIDOCAINE HYDROCHLORIDE 50 MG: 20 INJECTION, SOLUTION INTRAVENOUS at 07:35

## 2022-04-15 RX ADMIN — ROCURONIUM BROMIDE 30 MG: 10 SOLUTION INTRAVENOUS at 10:07

## 2022-04-15 RX ADMIN — EPHEDRINE SULFATE 20 MG: 50 INJECTION INTRAVENOUS at 08:09

## 2022-04-15 RX ADMIN — ONDANSETRON 4 MG: 2 INJECTION INTRAMUSCULAR; INTRAVENOUS at 12:11

## 2022-04-15 RX ADMIN — TRAMADOL HYDROCHLORIDE 100 MG: 50 TABLET, COATED ORAL at 16:41

## 2022-04-15 RX ADMIN — CEFAZOLIN SODIUM 2000 MG: 2 INJECTION, SOLUTION INTRAVENOUS at 07:35

## 2022-04-15 RX ADMIN — FENTANYL CITRATE 100 MCG: 50 INJECTION, SOLUTION INTRAMUSCULAR; INTRAVENOUS at 07:35

## 2022-04-15 RX ADMIN — FENTANYL CITRATE 50 MCG: 50 INJECTION, SOLUTION INTRAMUSCULAR; INTRAVENOUS at 12:53

## 2022-04-15 ASSESSMENT — PULMONARY FUNCTION TESTS
PIF_VALUE: 21
PIF_VALUE: 29
PIF_VALUE: 25
PIF_VALUE: 28
PIF_VALUE: 21
PIF_VALUE: 28
PIF_VALUE: 29
PIF_VALUE: 23
PIF_VALUE: 25
PIF_VALUE: 24
PIF_VALUE: 24
PIF_VALUE: 29
PIF_VALUE: 24
PIF_VALUE: 28
PIF_VALUE: 28
PIF_VALUE: 25
PIF_VALUE: 23
PIF_VALUE: 24
PIF_VALUE: 29
PIF_VALUE: 0
PIF_VALUE: 24
PIF_VALUE: 25
PIF_VALUE: 17
PIF_VALUE: 26
PIF_VALUE: 17
PIF_VALUE: 21
PIF_VALUE: 25
PIF_VALUE: 25
PIF_VALUE: 21
PIF_VALUE: 24
PIF_VALUE: 24
PIF_VALUE: 25
PIF_VALUE: 25
PIF_VALUE: 1
PIF_VALUE: 29
PIF_VALUE: 16
PIF_VALUE: 28
PIF_VALUE: 23
PIF_VALUE: 17
PIF_VALUE: 25
PIF_VALUE: 25
PIF_VALUE: 0
PIF_VALUE: 30
PIF_VALUE: 25
PIF_VALUE: 29
PIF_VALUE: 27
PIF_VALUE: 24
PIF_VALUE: 0
PIF_VALUE: 21
PIF_VALUE: 25
PIF_VALUE: 25
PIF_VALUE: 24
PIF_VALUE: 24
PIF_VALUE: 16
PIF_VALUE: 0
PIF_VALUE: 1
PIF_VALUE: 21
PIF_VALUE: 25
PIF_VALUE: 28
PIF_VALUE: 21
PIF_VALUE: 28
PIF_VALUE: 3
PIF_VALUE: 22
PIF_VALUE: 25
PIF_VALUE: 0
PIF_VALUE: 24
PIF_VALUE: 1
PIF_VALUE: 29
PIF_VALUE: 21
PIF_VALUE: 21
PIF_VALUE: 24
PIF_VALUE: 24
PIF_VALUE: 16
PIF_VALUE: 24
PIF_VALUE: 20
PIF_VALUE: 23
PIF_VALUE: 17
PIF_VALUE: 27
PIF_VALUE: 25
PIF_VALUE: 1
PIF_VALUE: 25
PIF_VALUE: 29
PIF_VALUE: 25
PIF_VALUE: 25
PIF_VALUE: 27
PIF_VALUE: 29
PIF_VALUE: 29
PIF_VALUE: 17
PIF_VALUE: 23
PIF_VALUE: 23
PIF_VALUE: 25
PIF_VALUE: 25
PIF_VALUE: 24
PIF_VALUE: 30
PIF_VALUE: 21
PIF_VALUE: 30
PIF_VALUE: 22
PIF_VALUE: 22
PIF_VALUE: 0
PIF_VALUE: 1
PIF_VALUE: 29
PIF_VALUE: 24
PIF_VALUE: 25
PIF_VALUE: 1
PIF_VALUE: 25
PIF_VALUE: 2
PIF_VALUE: 28
PIF_VALUE: 30
PIF_VALUE: 25
PIF_VALUE: 21
PIF_VALUE: 19
PIF_VALUE: 28
PIF_VALUE: 25
PIF_VALUE: 22
PIF_VALUE: 21
PIF_VALUE: 26
PIF_VALUE: 27
PIF_VALUE: 20
PIF_VALUE: 16
PIF_VALUE: 24
PIF_VALUE: 21
PIF_VALUE: 24
PIF_VALUE: 1
PIF_VALUE: 23
PIF_VALUE: 4
PIF_VALUE: 20
PIF_VALUE: 25
PIF_VALUE: 17
PIF_VALUE: 25
PIF_VALUE: 24
PIF_VALUE: 24
PIF_VALUE: 22
PIF_VALUE: 17
PIF_VALUE: 28
PIF_VALUE: 29
PIF_VALUE: 24
PIF_VALUE: 29
PIF_VALUE: 25
PIF_VALUE: 29
PIF_VALUE: 25
PIF_VALUE: 16
PIF_VALUE: 22
PIF_VALUE: 38
PIF_VALUE: 27
PIF_VALUE: 17
PIF_VALUE: 29
PIF_VALUE: 25
PIF_VALUE: 27
PIF_VALUE: 27
PIF_VALUE: 25
PIF_VALUE: 24
PIF_VALUE: 30
PIF_VALUE: 25
PIF_VALUE: 21
PIF_VALUE: 24
PIF_VALUE: 21
PIF_VALUE: 24
PIF_VALUE: 25
PIF_VALUE: 27
PIF_VALUE: 25
PIF_VALUE: 25
PIF_VALUE: 0
PIF_VALUE: 28
PIF_VALUE: 28
PIF_VALUE: 24
PIF_VALUE: 17
PIF_VALUE: 24
PIF_VALUE: 28
PIF_VALUE: 25
PIF_VALUE: 21
PIF_VALUE: 28
PIF_VALUE: 28
PIF_VALUE: 0
PIF_VALUE: 21
PIF_VALUE: 0
PIF_VALUE: 25
PIF_VALUE: 29
PIF_VALUE: 24
PIF_VALUE: 29
PIF_VALUE: 29
PIF_VALUE: 28
PIF_VALUE: 27
PIF_VALUE: 17
PIF_VALUE: 25
PIF_VALUE: 29
PIF_VALUE: 30
PIF_VALUE: 24
PIF_VALUE: 25
PIF_VALUE: 28
PIF_VALUE: 25
PIF_VALUE: 29
PIF_VALUE: 23
PIF_VALUE: 24
PIF_VALUE: 21
PIF_VALUE: 25
PIF_VALUE: 29
PIF_VALUE: 1
PIF_VALUE: 25
PIF_VALUE: 1
PIF_VALUE: 29
PIF_VALUE: 23
PIF_VALUE: 29
PIF_VALUE: 25
PIF_VALUE: 16
PIF_VALUE: 25
PIF_VALUE: 24
PIF_VALUE: 0
PIF_VALUE: 25
PIF_VALUE: 29
PIF_VALUE: 25
PIF_VALUE: 28
PIF_VALUE: 25
PIF_VALUE: 30
PIF_VALUE: 29
PIF_VALUE: 30
PIF_VALUE: 24
PIF_VALUE: 25
PIF_VALUE: 18
PIF_VALUE: 24
PIF_VALUE: 22
PIF_VALUE: 28
PIF_VALUE: 25
PIF_VALUE: 19
PIF_VALUE: 24
PIF_VALUE: 27
PIF_VALUE: 1
PIF_VALUE: 25
PIF_VALUE: 27
PIF_VALUE: 17
PIF_VALUE: 28
PIF_VALUE: 25
PIF_VALUE: 25
PIF_VALUE: 28
PIF_VALUE: 21
PIF_VALUE: 21
PIF_VALUE: 25
PIF_VALUE: 27
PIF_VALUE: 29
PIF_VALUE: 24
PIF_VALUE: 23
PIF_VALUE: 24
PIF_VALUE: 29
PIF_VALUE: 22
PIF_VALUE: 17
PIF_VALUE: 24
PIF_VALUE: 27
PIF_VALUE: 23
PIF_VALUE: 25
PIF_VALUE: 27
PIF_VALUE: 21
PIF_VALUE: 24
PIF_VALUE: 21
PIF_VALUE: 26
PIF_VALUE: 24
PIF_VALUE: 28
PIF_VALUE: 1
PIF_VALUE: 0
PIF_VALUE: 25
PIF_VALUE: 1
PIF_VALUE: 19
PIF_VALUE: 21
PIF_VALUE: 0
PIF_VALUE: 25
PIF_VALUE: 25
PIF_VALUE: 21
PIF_VALUE: 25
PIF_VALUE: 25
PIF_VALUE: 30
PIF_VALUE: 25
PIF_VALUE: 16
PIF_VALUE: 22
PIF_VALUE: 17
PIF_VALUE: 21
PIF_VALUE: 23
PIF_VALUE: 25
PIF_VALUE: 24
PIF_VALUE: 28
PIF_VALUE: 22
PIF_VALUE: 25
PIF_VALUE: 21
PIF_VALUE: 24
PIF_VALUE: 30
PIF_VALUE: 27
PIF_VALUE: 28
PIF_VALUE: 25
PIF_VALUE: 25
PIF_VALUE: 23
PIF_VALUE: 25
PIF_VALUE: 27
PIF_VALUE: 30
PIF_VALUE: 20
PIF_VALUE: 20
PIF_VALUE: 27
PIF_VALUE: 27
PIF_VALUE: 17
PIF_VALUE: 22
PIF_VALUE: 25
PIF_VALUE: 17
PIF_VALUE: 28
PIF_VALUE: 25
PIF_VALUE: 25
PIF_VALUE: 0
PIF_VALUE: 22
PIF_VALUE: 1
PIF_VALUE: 24
PIF_VALUE: 28
PIF_VALUE: 21

## 2022-04-15 ASSESSMENT — PAIN DESCRIPTION - FREQUENCY
FREQUENCY: CONTINUOUS
FREQUENCY: CONTINUOUS
FREQUENCY: INTERMITTENT
FREQUENCY: INTERMITTENT
FREQUENCY: CONTINUOUS

## 2022-04-15 ASSESSMENT — PAIN DESCRIPTION - DESCRIPTORS
DESCRIPTORS: SORE;ACHING
DESCRIPTORS: THROBBING
DESCRIPTORS: SORE;ACHING
DESCRIPTORS: SORE;ACHING
DESCRIPTORS: THROBBING;SORE

## 2022-04-15 ASSESSMENT — PAIN DESCRIPTION - LOCATION
LOCATION: ABDOMEN

## 2022-04-15 ASSESSMENT — PAIN DESCRIPTION - PROGRESSION
CLINICAL_PROGRESSION: GRADUALLY IMPROVING
CLINICAL_PROGRESSION: NOT CHANGED
CLINICAL_PROGRESSION: GRADUALLY WORSENING
CLINICAL_PROGRESSION: NOT CHANGED
CLINICAL_PROGRESSION: NOT CHANGED

## 2022-04-15 ASSESSMENT — PAIN SCALES - GENERAL
PAINLEVEL_OUTOF10: 5
PAINLEVEL_OUTOF10: 7
PAINLEVEL_OUTOF10: 8
PAINLEVEL_OUTOF10: 8
PAINLEVEL_OUTOF10: 7
PAINLEVEL_OUTOF10: 6
PAINLEVEL_OUTOF10: 0
PAINLEVEL_OUTOF10: 6
PAINLEVEL_OUTOF10: 8

## 2022-04-15 ASSESSMENT — PAIN DESCRIPTION - PAIN TYPE
TYPE: SURGICAL PAIN;CHRONIC PAIN
TYPE: SURGICAL PAIN

## 2022-04-15 ASSESSMENT — PAIN DESCRIPTION - ONSET
ONSET: ON-GOING

## 2022-04-15 ASSESSMENT — PAIN DESCRIPTION - ORIENTATION
ORIENTATION: RIGHT;LEFT;MID;UPPER;LOWER
ORIENTATION: LEFT
ORIENTATION: RIGHT;LEFT;MID;UPPER;LOWER
ORIENTATION: LEFT
ORIENTATION: LEFT

## 2022-04-15 ASSESSMENT — PAIN - FUNCTIONAL ASSESSMENT
PAIN_FUNCTIONAL_ASSESSMENT: PREVENTS OR INTERFERES SOME ACTIVE ACTIVITIES AND ADLS
PAIN_FUNCTIONAL_ASSESSMENT: 0-10

## 2022-04-15 ASSESSMENT — LIFESTYLE VARIABLES: SMOKING_STATUS: 1

## 2022-04-15 NOTE — ANESTHESIA POSTPROCEDURE EVALUATION
Department of Anesthesiology  Postprocedure Note    Patient: Emil De La Rosallor  MRN: [id number]  [place]: 1975  Date of evaluation: 4/15/2022  Time:  12:51 PM     Procedure Summary     Date: 04/15/22 Room / Location: [address] [place] [address]    Anesthesia Start: 4904 Anesthesia Stop: 4376    Procedures:       LEFT NEPHRECTOMY LAPAROSCOPIC ROBOTIC WITH STENT REMOVAL (Left )      RECURRENT INCISIONAL HERNIA  REPAIR LAPAROSCOPIC ROBOTIC (N/A Abdomen) Diagnosis:       Hydronephrosis with ureteropelvic junction obstruction (CODE)      (Hydronephrosis with ureteropelvic junction obstruction (CODE) [N13.0])    Surgeons: Darrick Crane MD; Osei Ladd MD Responsible Provider: Bertin Cooley MD    Anesthesia Type: general ASA Status: 2          Anesthesia Type: general    Arsalan Phase I: Arsalan Score: 8    Arsalan Phase II:      Last vitals: Reviewed and per EMR flowsheets.        Anesthesia Post Evaluation    Patient location during evaluation: PACU  Patient participation: complete - patient participated  Level of consciousness: sleepy but conscious  Airway patency: patent  Nausea & Vomiting: no nausea  Complications: no  Cardiovascular status: hemodynamically stable  Respiratory status: acceptable  Hydration status: euvolemic

## 2022-04-15 NOTE — BRIEF OP NOTE
Brief Postoperative Note      Patient: Shena Cho  YOB: 1975  MRN: [id number]    Date of Procedure: 4/15/2022    Pre-Op Diagnosis: Hydronephrosis with ureteropelvic junction obstruction (CODE) [N13.0]    Post-Op Diagnosis: Same with Recurrent incisional hernia       Procedure(s):  LEFT NEPHRECTOMY LAPAROSCOPIC ROBOTIC WITH STENT REMOVAL  RECURRENT INCISIONAL HERNIA  REPAIR LAPAROSCOPIC ROBOTIC    Surgeon(s):  MD Patti Snell MD Liisa Quan, DO    Assistant:  First Assistant: Tuyet Clark; Melchor De Los Santos PA-C    Anesthesia: General    Estimated Blood Loss (mL): Minimal    Complications: None    Specimens:   ID Type Source Tests Collected by Time Destination   A : LEFT KIDNEY AND STENT Tissue Kidney SURGICAL PATHOLOGY Patti Rutherford MD 4/15/2022 0920        Implants:  Implant Name Type Inv. Item Serial No.  Lot No. LRB No. Used Action   MESH SURG [id number] DIA5MM POLYPR RECT FLAT FOR SFT TISS REP - [id number]  MESH SURG [id number] DIA5MM POLYPR RECT FLAT FOR SFT TISS REP  [employer] [id number]  1 Implanted         Drains:   Urethral Catheter Non-latex 16 fr (Active)       Findings: Recurrent incisional hernia - repaired in pre-peritoneal space following the conclusion of the L nephrectomy with Dr. Phan Rincon.      Electronically signed by Melchor De Los Santos PA-C on 4/15/2022 at 12:18 PM

## 2022-04-15 NOTE — OP NOTE
[address] [place] [address], [address] [place] [address]                                OPERATIVE REPORT    PATIENT NAME: Sirera Almazan                    :        1975  MED REC NO:   [id number]                          ROOM:  ACCOUNT NO:   [de-identified]                           ADMIT DATE: 04/15/2022  PROVIDER:     Dhruv Dumont MD    DATE OF PROCEDURE:  04/15/2022    PREOPERATIVE DIAGNOSES:  Chronic left UPJ obstruction,nonfunctioning  left kidney. POSTOPERATIVE DIAGNOSES:  Chronic left UPJ obstruction,nonfunctioning  left kidney. OPERATION PERFORMED:  Robotic-assisted laparoscopic left nephrectomy. SURGEON:  Dhruv Dumont MD    ANESTHESIA FOR THE CASE:  General.    ESTIMATED BLOOD LOSS: Minimal.    COMPLICATIONS:  None. DRAINS LEFT IN PLACE:  16-Armenian Mao. FINDINGS OF THE CASE:  Atrophic kidney with posterior inflammation with  some chronic indwelling stent. Stent was removed at the end of the  case. INDICATION FOR PROCEDURE:  The patient is a 70-year-old who presented  with intermittent chronic left flank pain. She was found to have a  chronic UPJ obstruction with a stent in place with residual function on  the kidney, but still very low below 20% and she was recommended for a  nephrectomy. Risks and benefits of surgery were discussed with her at  length including bleeding, infection, urethra injury, vascular injury,  bowel injury, and need for additional surgical procedures and she is  agreeable to it. Of note, she also had a periumbilical hernia and Dr. Brian Silva scheduled a joint case to repair the umbilical hernia at the end  of the procedure. DETAILS OF PROCEDURE:  After induction of general anesthesia, placed in  the lateral decubitus position with the left side up. Axillary roll was  placed in the axilla. All dependent areas were carefully padded. Table  was flexed. She was secured with a beanbag.   An armrest was able to  hold her left arm up without excessive tension. Once everything was  secured, she was prepped and draped according to the sterile technique. Time-out was taken. All plans were agreed upon. IV antibiotics were  given. A small incision was made in the left upper quadrant. 5-mm port  was inserted into the abdomen without difficulty. Abdomen was fully  insufflated. There were omental adhesions onto the anterior abdominal  wall and lateral wall. We were able to place four robotic ports  especially in the line on the left side and then placed an assistant  port more medially between the first two robotic ports. Once the ports  were in place, the robot was docked without difficulty. Once docked in  place, we went ahead and began the procedure. I released the small  amount of adhesions overlying the omentum onto the anterior abdominal  wall. I released it until we could reach the line of Toldt and incised  that and then reflected the colon medially. _____ fair amount dissected  slowly _____ adherent, but that was consistent with her prior  inflammation in this kidney. We were able to free the colon up all the  way below the lower pole all the way to the upper pole and then free the  spleen by incising the lateral peritoneum lateral to the spleen. We  were able to rotate the spleen medially and thus exposed the entire  upper pole of the kidney. Once I had the entire kidney freed, I could  actually see the adrenal gland. We then worked inferiorly, isolated the  gonadal vein and then dissected it below down until we could find the  ureter and the ureteral complexes were severely adherent. We had to  work through that until we could then get underneath the entire  structure and get to the psoas muscle.   Upon lifting up, she had a fair  amount of as mentioned inflammatory tissue medially and posteriorly and  there was some lymphatic tissue as well that was carefully cauterized  until we

## 2022-04-15 NOTE — H&P
General Surgery - H&P  Dr. Behzad Taveras PA-C      SUBJECTIVE:  HPI: Patient presents with a recurrent incisional hernia. Symptoms were first noted several months ago. Pain is progressive and worsening . Lump is reducible. Pt dnies nausea vomiting. Pt with previous history of abdominal surgery. Patient is to have left nephrectomy by Dr. Rob Ramirez today.     I have reviewed the patient's(pertinent information to this visit) medical history, family history(scanned in  theMedia tab under \"patient questioner\"), social history and review of systems with the patient today in the office.        Past Surgical History         Past Surgical History:   Procedure Laterality Date    BLADDER SURGERY Left 9/17/2021     LEFT CYSTOSCOPY RETROGRADE PYELOGRAM STENT INSERTION performed by Lynne Aparicio MD at [facility], [address] [place] [address] 2000, 2006( with BPS)     x 5    HERNIA REPAIR        OTHER SURGICAL HISTORY   12 29 2015     robotic assist lap ventral hernia repair with mesh graft    UMBILICAL HERNIA REPAIR   03/31/15     laparoscopic with mesh    UMBILICAL HERNIA REPAIR   01/23/2017     Robotic    VENTRAL HERNIA REPAIR   01/23/2017     Robotic    WISDOM TOOTH EXTRACTION         3 of 4 extracted         Past Medical History        Past Medical History:   Diagnosis Date    Asthma       last flare up 11/2016    Migraine       Last migraine 12/2015    Ovarian cyst       Rt side    Wears glasses              Family History         Family History   Problem Relation Age of Onset    Diabetes Mother      Other Mother           thyroid    High Blood Pressure Mother      COPD Mother      Kidney Disease Mother      High Blood Pressure Sister      Emphysema Sister      High Blood Pressure Sister      Early Death Son      Hearing Loss Daughter           Social History               Socioeconomic History    Marital status: Legally        Spouse name: Not on file    Number of children: Not on file    Years of education: Not on file    Highest education level: Not on file   Occupational History    Not on file   Tobacco Use    Smoking status: Current Some Day Smoker       Packs/day: 0.50       Years: 8.00       Pack years: 4.00       Types: Cigarettes    Smokeless tobacco: Never Used   Substance and Sexual Activity    Alcohol use: Yes       Comment: occasional       CAFFEINE: 2 liter soda daily    Drug use: No    Sexual activity: Yes       Partners: Male   Other Topics Concern    Not on file   Social History Narrative    Not on file      Social Determinants of Health          Financial Resource Strain:     Difficulty of Paying Living Expenses: Not on file   Food Insecurity:     Worried About Running Out of Food in the Last Year: Not on file    Catrachita of Food in the Last Year: Not on file   Transportation Needs:     Lack of Transportation (Medical): Not on file    Lack of Transportation (Non-Medical):  Not on file   Physical Activity:     Days of Exercise per Week: Not on file    Minutes of Exercise per Session: Not on file   Stress:     Feeling of Stress : Not on file   Social Connections:     Frequency of Communication with Friends and Family: Not on file    Frequency of Social Gatherings with Friends and Family: Not on file    Attends Worship Services: Not on file    Active Member of [address] [place] [address] [employer] or Organizations: Not on file    Attends Club or Organization Meetings: Not on file    Marital Status: Not on file   Intimate Partner Violence:     Fear of Current or Ex-Partner: Not on file    Emotionally Abused: Not on file    Physically Abused: Not on file    Sexually Abused: Not on file   Housing Stability:     Unable to Pay for Housing in the Last Year: Not on file    Number of [place] in the Last Year: Not on file    Unstable Housing in the Last Year: Not on file            Current Facility-Administered Medications          Current Outpatient Medications Medication Sig Dispense Refill    albuterol sulfate  (90 Base) MCG/ACT inhaler Inhale 2 puffs into the lungs every 6 hours as needed for Wheezing or Shortness of Breath (or cough) Please include spacer with instructions for use. 1 Inhaler 0    naproxen (NAPROSYN) 500 MG tablet Take 1 tablet by mouth 2 times daily 15 tablet 0    ondansetron (ZOFRAN ODT) 4 MG disintegrating tablet Take 1 tablet by mouth every 8 hours as needed for Nausea or Vomiting (Patient taking differently: Take 4 mg by mouth every 8 hours as needed for Nausea or Vomiting prn) 15 tablet 0    metoprolol tartrate (LOPRESSOR) 25 MG tablet Take 25 mg by mouth 2 times daily        acetaminophen (TYLENOL) 500 MG tablet Take 1,000 mg by mouth every 6 hours as needed for Pain        Acetaminophen-Caff-Pyrilamine (MIDOL COMPLETE PO) Take 2 tablets by mouth 2 times daily as needed           No current facility-administered medications for this visit. Allergies   Allergen Reactions    Ciprofloxacin Hives         Review of Systems:          Review of Systems   Constitutional: Negative for chills and fever. HENT: Negative for ear pain, mouth sores, sore throat and tinnitus. Eyes: Negative for photophobia, redness and itching. Respiratory: Negative for apnea, choking and stridor. Cardiovascular: Negative for chest pain and palpitations. Gastrointestinal: Positive for abdominal pain. Negative for anal bleeding, constipation and rectal pain. Endocrine: Negative for polydipsia. Genitourinary: Negative for enuresis, flank pain and hematuria. Musculoskeletal: Negative for back pain, joint swelling and myalgias. Skin: Negative for color change and pallor. Allergic/Immunologic: Negative for environmental allergies. Neurological: Negative for syncope and speech difficulty.    Psychiatric/Behavioral: Negative for confusion and hallucinations.            OBJECTIVE:  Physical Exam:    /80   Pulse 76   Wt 228 lb (103.4 kg)   SpO2 100%   BMI 33.67 kg/m²       Physical Exam  Constitutional:       Appearance: She is well-developed. HENT:      Head: Normocephalic. Eyes:      Pupils: Pupils are equal, round, and reactive to light. Cardiovascular:      Rate and Rhythm: Normal rate. Pulmonary:      Effort: Pulmonary effort is normal.   Abdominal:      General: There is no distension. Palpations: Abdomen is soft. There is no mass. Tenderness: There is abdominal tenderness. There is no guarding or rebound. Hernia: A hernia is present. Musculoskeletal:         General: Normal range of motion. Cervical back: Normal range of motion and neck supple. Skin:     General: Skin is warm. Neurological:      Mental Status: She is alert and oriented to person, place, and time.             ASSESSMENT:  Incisional hernia        PLAN:  Treatment: Will proceed with robotic assisted incisional hernia repair to follow left nephrectomy performed  By Dr. Kacie Altamirano. Patient counseled on risks, benefits, and alternatives of treatment plan at length. Patient states an understanding and willingness to proceed with plan. The patient was counseled at length about the risks of césar Covid-19 during their perioperative period and any recovery window from their procedure. The patient was made aware that césar Covid-19  may worsen their prognosis for recovering from their procedure  and lend to a higher morbidity and/or mortality risk. All material risks, benefits, and reasonable alternatives including postponing the procedure were discussed. The patient does wish to proceed with the procedure at this time.       Chiquis Das PA-C

## 2022-04-15 NOTE — PROGRESS NOTES
4 Eyes Skin Assessment     NAME:  Cassandra Avery OF BIRTH:  1975  MEDICAL RECORD NUMBER:  [id number]    The patient is being assess for  Admission    I agree that 2 RN's have performed a thorough Head to Toe Skin Assessment on the patient. ALL assessment sites listed below have been assessed. Areas assessed by both nurses:    Head, Face, Ears, Shoulders, Back, Chest, Arms, Elbows, Hands, Sacrum. Buttock, Coccyx, Ischium and Legs. Feet and Heels patient refused to turn due to surgical pain and reported no wounds on buttocks, coccyx or ischium. Patient has 5 surgical lap sites to her abdomen. Does the Patient have a Wound? Yes wound(s) were present on assessment.  LDA wound assessment was Initiated and completed        Parth Prevention initiated:  Yes   Wound Care Orders initiated:  Yes    Pressure Injury (Stage 3,4, Unstageable, DTI, NWPT, and Complex wounds) if present place consult order under [de-identified] No    New and Established Ostomies if present place consult order under : No      Nurse 1 eSignature: Electronically signed by Joana Walls RN on 4/15/22 at 6:19 PM EDT    **SHARE this note so that the co-signing nurse is able to place an eSignature**    Nurse 2 eSignature: Electronically signed by Ruth Lundy RN on 4/15/22 at 6:19 PM EDT

## 2022-04-16 LAB
ANION GAP SERPL CALCULATED.3IONS-SCNC: 11 MMOL/L (ref 4–16)
BUN BLDV-MCNC: 9 MG/DL (ref 6–23)
CALCIUM SERPL-MCNC: 9.7 MG/DL (ref 8.3–10.6)
CHLORIDE BLD-SCNC: 107 MMOL/L (ref 99–110)
CO2: 22 MMOL/L (ref 21–32)
CREAT SERPL-MCNC: 0.8 MG/DL (ref 0.6–1.1)
GFR AFRICAN AMERICAN: >60 ML/MIN/1.73M2
GFR NON-AFRICAN AMERICAN: >60 ML/MIN/1.73M2
GLUCOSE BLD-MCNC: 116 MG/DL (ref 70–99)
HCT VFR BLD CALC: 39 % (ref 37–47)
HEMOGLOBIN: 12.8 GM/DL (ref 12.5–16)
MCH RBC QN AUTO: 31.6 PG (ref 27–31)
MCHC RBC AUTO-ENTMCNC: 32.8 % (ref 32–36)
MCV RBC AUTO: 96.3 FL (ref 78–100)
PDW BLD-RTO: 13.2 % (ref 11.7–14.9)
PLATELET # BLD: 244 K/CU MM (ref 140–440)
PMV BLD AUTO: 11.4 FL (ref 7.5–11.1)
POTASSIUM SERPL-SCNC: 4.1 MMOL/L (ref 3.5–5.1)
RBC # BLD: 4.05 M/CU MM (ref 4.2–5.4)
SODIUM BLD-SCNC: 140 MMOL/L (ref 135–145)
WBC # BLD: 14.7 K/CU MM (ref 4–10.5)

## 2022-04-16 PROCEDURE — 2580000003 HC RX 258: Performed by: SPECIALIST

## 2022-04-16 PROCEDURE — 80048 BASIC METABOLIC PNL TOTAL CA: CPT

## 2022-04-16 PROCEDURE — 1200000000 HC SEMI PRIVATE

## 2022-04-16 PROCEDURE — 94150 VITAL CAPACITY TEST: CPT

## 2022-04-16 PROCEDURE — 85027 COMPLETE CBC AUTOMATED: CPT

## 2022-04-16 PROCEDURE — 94761 N-INVAS EAR/PLS OXIMETRY MLT: CPT

## 2022-04-16 PROCEDURE — 6370000000 HC RX 637 (ALT 250 FOR IP): Performed by: SPECIALIST

## 2022-04-16 PROCEDURE — 36415 COLL VENOUS BLD VENIPUNCTURE: CPT

## 2022-04-16 PROCEDURE — 99024 POSTOP FOLLOW-UP VISIT: CPT | Performed by: NURSE PRACTITIONER

## 2022-04-16 PROCEDURE — APPNB15 APP NON BILLABLE TIME 0-15 MINS: Performed by: NURSE PRACTITIONER

## 2022-04-16 RX ORDER — LISINOPRIL 40 MG/1
40 TABLET ORAL DAILY
Status: DISCONTINUED | OUTPATIENT
Start: 2022-04-16 | End: 2022-04-17 | Stop reason: HOSPADM

## 2022-04-16 RX ADMIN — LISINOPRIL 40 MG: 40 TABLET ORAL at 11:52

## 2022-04-16 RX ADMIN — DEXTROSE AND SODIUM CHLORIDE: 5; 450 INJECTION, SOLUTION INTRAVENOUS at 07:26

## 2022-04-16 RX ADMIN — SODIUM CHLORIDE, PRESERVATIVE FREE 10 ML: 5 INJECTION INTRAVENOUS at 20:25

## 2022-04-16 RX ADMIN — TRAMADOL HYDROCHLORIDE 50 MG: 50 TABLET, COATED ORAL at 07:27

## 2022-04-16 RX ADMIN — SODIUM CHLORIDE, PRESERVATIVE FREE 10 ML: 5 INJECTION INTRAVENOUS at 09:04

## 2022-04-16 RX ADMIN — ACETAMINOPHEN 650 MG: 325 TABLET ORAL at 21:53

## 2022-04-16 RX ADMIN — ACETAMINOPHEN 650 MG: 325 TABLET ORAL at 09:04

## 2022-04-16 RX ADMIN — BISACODYL 5 MG: 5 TABLET, COATED ORAL at 09:04

## 2022-04-16 RX ADMIN — METOPROLOL TARTRATE 25 MG: 25 TABLET, FILM COATED ORAL at 09:04

## 2022-04-16 RX ADMIN — TRAMADOL HYDROCHLORIDE 100 MG: 50 TABLET, COATED ORAL at 14:50

## 2022-04-16 RX ADMIN — METOPROLOL TARTRATE 25 MG: 25 TABLET, FILM COATED ORAL at 20:24

## 2022-04-16 RX ADMIN — TRAMADOL HYDROCHLORIDE 50 MG: 50 TABLET, COATED ORAL at 20:24

## 2022-04-16 RX ADMIN — ARIPIPRAZOLE 10 MG: 10 TABLET ORAL at 09:03

## 2022-04-16 RX ADMIN — ACETAMINOPHEN 650 MG: 325 TABLET ORAL at 03:16

## 2022-04-16 ASSESSMENT — PAIN SCALES - GENERAL
PAINLEVEL_OUTOF10: 5
PAINLEVEL_OUTOF10: 6
PAINLEVEL_OUTOF10: 4
PAINLEVEL_OUTOF10: 4
PAINLEVEL_OUTOF10: 5
PAINLEVEL_OUTOF10: 0
PAINLEVEL_OUTOF10: 5
PAINLEVEL_OUTOF10: 0
PAINLEVEL_OUTOF10: 5

## 2022-04-16 ASSESSMENT — PAIN DESCRIPTION - LOCATION
LOCATION: ABDOMEN;BACK
LOCATION: BACK
LOCATION: ABDOMEN
LOCATION: ABDOMEN

## 2022-04-16 ASSESSMENT — PAIN DESCRIPTION - FREQUENCY
FREQUENCY: INTERMITTENT
FREQUENCY: INTERMITTENT
FREQUENCY: CONTINUOUS
FREQUENCY: INTERMITTENT

## 2022-04-16 ASSESSMENT — PAIN DESCRIPTION - ORIENTATION
ORIENTATION: RIGHT;LEFT;MID;UPPER;LOWER
ORIENTATION: RIGHT;LEFT;LOWER;MID;UPPER
ORIENTATION: RIGHT;LEFT;MID;UPPER;LOWER
ORIENTATION: LOWER

## 2022-04-16 ASSESSMENT — PAIN DESCRIPTION - PROGRESSION
CLINICAL_PROGRESSION: GRADUALLY IMPROVING
CLINICAL_PROGRESSION: GRADUALLY IMPROVING
CLINICAL_PROGRESSION: NOT CHANGED
CLINICAL_PROGRESSION: GRADUALLY IMPROVING

## 2022-04-16 ASSESSMENT — PAIN DESCRIPTION - DESCRIPTORS
DESCRIPTORS: THROBBING
DESCRIPTORS: STABBING
DESCRIPTORS: CONSTANT

## 2022-04-16 ASSESSMENT — PAIN - FUNCTIONAL ASSESSMENT
PAIN_FUNCTIONAL_ASSESSMENT: PREVENTS OR INTERFERES SOME ACTIVE ACTIVITIES AND ADLS

## 2022-04-16 ASSESSMENT — PAIN DESCRIPTION - PAIN TYPE
TYPE: ACUTE PAIN;SURGICAL PAIN
TYPE: CHRONIC PAIN
TYPE: SURGICAL PAIN
TYPE: SURGICAL PAIN

## 2022-04-16 ASSESSMENT — PAIN DESCRIPTION - ONSET
ONSET: ON-GOING
ONSET: ON-GOING

## 2022-04-16 NOTE — PROGRESS NOTES
GENERAL SURGERY INPATIENT PROGRESS NOTE  [facility]) Physicians    PATIENT: Tino Coleman, 55 y.o., female, MRN: [id number]    Hospital Day:  LOS: 1 day , Post-Op Day: 1 Day Post-Op              Subjective:    Patient was stable overnight. Chart reviewed. The patient feels tired. Pain is moderately controlled with current medications. The patient is tolerating a clear diet. Nausea negative, vomitting negative.     Objective:    Vitals: BP (!) 154/84   Pulse 82   Temp 98.2 °F (36.8 °C) (Oral)   Resp 14   Ht 5' 8\" (1.727 m)   Wt 237 lb 8 oz (107.7 kg)   LMP 09/11/2021   SpO2 92%   BMI 36.11 kg/m²     I/O: 04/14 1901 - 04/16 0700  In: 2100 [I.V.:2100]  Out: 2515 [Urine:2475]    IV Fluids: sodium chloride    dextrose 5 % and 0.45 % NaCl Last Rate: 50 mL/hr at 04/16/22 9222    Scheduled Meds: ARIPiprazole, 10 mg, Oral, Daily    metoprolol tartrate, 25 mg, Oral, BID    sodium chloride flush, 5-40 mL, IntraVENous, 2 times per day    bisacodyl, 5 mg, Oral, Daily    acetaminophen, 650 mg, Oral, Q6H    Physical Exam:  General Appearance:   Alert, cooperative, no distress    Head:   Normocephalic, atraumatic    Lungs:    Equal chest rise, respirations unlabored   Heart:   Regular rate and rhythm    Abdomen:    Soft, mild tenderness as expected, no rebound or guarding    Extremities:  No cyanosis or edema    Neurologic:  Nonfocal, grossly intact        Labs/Imaging Results:   Recent Results (from the past 24 hour(s))   Basic Metabolic Panel    Collection Time: 04/16/22  6:34 AM   Result Value Ref Range    Sodium 140 135 - 145 MMOL/L    Potassium 4.1 3.5 - 5.1 MMOL/L    Chloride 107 99 - 110 mMol/L    CO2 22 21 - 32 MMOL/L    Anion Gap 11 4 - 16    BUN 9 6 - 23 MG/DL    CREATININE 0.8 0.6 - 1.1 MG/DL    Glucose 116 (H) 70 - 99 MG/DL    Calcium 9.7 8.3 - 10.6 MG/DL    GFR Non-African American >60 >60 mL/min/1.73m2    GFR African American >60 >60 mL/min/1.73m2   CBC    Collection Time: 04/16/22  6:34 AM   Result Value Ref Range    WBC 14.7 (H) 4.0 - 10.5 K/CU MM    RBC 4.05 (L) 4.2 - 5.4 M/CU MM    Hemoglobin 12.8 12.5 - 16.0 GM/DL    Hematocrit 39.0 37 - 47 %    MCV 96.3 78 - 100 FL    MCH 31.6 (H) 27 - 31 PG    MCHC 32.8 32.0 - 36.0 %    RDW 13.2 11.7 - 14.9 %    Platelets 200 726 - 518 K/CU MM    MPV 11.4 (H) 7.5 - 11.1 FL       Assessment:  Emil Doshi is a 55 y.o. female who is post-op day #1 Day Post-Op left nephrectomy with stent removal and repair of recurrent incisional hernia.        Stable postop    Diet: tolerating clears   Ambulation: OOB to chair, mobilize as able   Respiratory:  IS at bedside, encourage hourly IS and deep breathing    YOLANDA Bain-CNP  4/16/2022  10:45 AM

## 2022-04-16 NOTE — PROGRESS NOTES
[facility] [address]   Psychiatric  Progress Note 0 1 2      Date: 2022   Patient: Matias Harrington   : 1975   DOA: 4/15/2022   MRN: [id number]   ROOM#: [facility]     Subjective     I'm well  Hasn't walked yet     Objective     AF  HTN    Good UOP    CBC:   Lab Results   Component Value Date    WBC 14.7 2022    RBC 4.05 2022    HGB 12.8 2022    HCT 39.0 2022    MCV 96.3 2022    MCH 31.6 2022    MCHC 32.8 2022    RDW 13.2 2022     2022    MPV 11.4 2022     BMP:    Lab Results   Component Value Date     2022    K 4.1 2022     2022    CO2 22 2022    BUN 9 2022    LABALBU 4.5 2022    LABALBU 4.5 2022    CREATININE 0.8 2022    CALCIUM 9.7 2022    GFRAA >60 2022    LABGLOM >60 2022    GLUCOSE 116 2022       Physical Exam:     NAD  Incisions c/d/i  NT/ND  Urine clear    Assessment/Plan    Matias Harrington is a 55 y.o. female admitted 4/15/2022 for s/p left simple nephrectomy    1) left nephrectomy: stable cr (0.8), hgb 12.8 (13.7 pre op)     D/c catheter   Regular diet   Anticipate safe for d/c tomorrow AM    Patient seen and examined, chart reviewed.      Jose Byrnes MD     Electronically signed at 2022

## 2022-04-17 VITALS
WEIGHT: 235 LBS | HEIGHT: 68 IN | SYSTOLIC BLOOD PRESSURE: 182 MMHG | DIASTOLIC BLOOD PRESSURE: 88 MMHG | HEART RATE: 88 BPM | TEMPERATURE: 98.8 F | OXYGEN SATURATION: 94 % | RESPIRATION RATE: 18 BRPM | BODY MASS INDEX: 35.61 KG/M2

## 2022-04-17 PROCEDURE — 99024 POSTOP FOLLOW-UP VISIT: CPT | Performed by: SURGERY

## 2022-04-17 PROCEDURE — 6370000000 HC RX 637 (ALT 250 FOR IP): Performed by: SPECIALIST

## 2022-04-17 PROCEDURE — 2580000003 HC RX 258: Performed by: SPECIALIST

## 2022-04-17 RX ADMIN — TRAMADOL HYDROCHLORIDE 50 MG: 50 TABLET, COATED ORAL at 03:19

## 2022-04-17 RX ADMIN — SODIUM CHLORIDE, PRESERVATIVE FREE 10 ML: 5 INJECTION INTRAVENOUS at 08:40

## 2022-04-17 RX ADMIN — ARIPIPRAZOLE 10 MG: 10 TABLET ORAL at 08:40

## 2022-04-17 RX ADMIN — LISINOPRIL 40 MG: 40 TABLET ORAL at 08:40

## 2022-04-17 RX ADMIN — ACETAMINOPHEN 650 MG: 325 TABLET ORAL at 08:40

## 2022-04-17 RX ADMIN — METOPROLOL TARTRATE 25 MG: 25 TABLET, FILM COATED ORAL at 08:40

## 2022-04-17 RX ADMIN — BISACODYL 5 MG: 5 TABLET, COATED ORAL at 08:40

## 2022-04-17 RX ADMIN — DEXTROSE AND SODIUM CHLORIDE: 5; 450 INJECTION, SOLUTION INTRAVENOUS at 03:23

## 2022-04-17 ASSESSMENT — PAIN DESCRIPTION - ORIENTATION: ORIENTATION: LOWER

## 2022-04-17 ASSESSMENT — PAIN DESCRIPTION - FREQUENCY
FREQUENCY: INTERMITTENT
FREQUENCY: CONTINUOUS

## 2022-04-17 ASSESSMENT — PAIN DESCRIPTION - DESCRIPTORS
DESCRIPTORS: SORE
DESCRIPTORS: STABBING

## 2022-04-17 ASSESSMENT — PAIN SCALES - GENERAL
PAINLEVEL_OUTOF10: 5
PAINLEVEL_OUTOF10: 0

## 2022-04-17 ASSESSMENT — PAIN - FUNCTIONAL ASSESSMENT: PAIN_FUNCTIONAL_ASSESSMENT: PREVENTS OR INTERFERES SOME ACTIVE ACTIVITIES AND ADLS

## 2022-04-17 ASSESSMENT — PAIN DESCRIPTION - LOCATION
LOCATION: ABDOMEN
LOCATION: BACK

## 2022-04-17 ASSESSMENT — PAIN DESCRIPTION - PAIN TYPE
TYPE: CHRONIC PAIN
TYPE: SURGICAL PAIN

## 2022-04-17 ASSESSMENT — PAIN DESCRIPTION - ONSET
ONSET: ON-GOING
ONSET: SUDDEN

## 2022-04-17 ASSESSMENT — PAIN DESCRIPTION - PROGRESSION
CLINICAL_PROGRESSION: GRADUALLY IMPROVING
CLINICAL_PROGRESSION: NOT CHANGED

## 2022-04-17 NOTE — PROGRESS NOTES
[facility]    PATIENT: Raj Teixeira, 55 y.o., female, MRN: [id number]    Hospital Day:  LOS: 2 days , Post-Op Day: 2 Days Post-Op              Subjective:    Patient was stable overnight. Chart reviewed. The patient feels tired. Pain is moderately controlled with current medications. The patient is tolerating a diet. Nausea negative, vomiting negative. Objective:    Vitals: BP (!) 182/88   Pulse 88   Temp 98.4 °F (36.9 °C) (Oral)   Resp 16   Ht 5' 8\" (1.727 m)   Wt 235 lb (106.6 kg)   LMP 09/11/2021   SpO2 92%   BMI 35.73 kg/m²     I/O: 04/15 1901 - 04/17 0700  In: -   Out: 3575 [Urine:3575]    IV Fluids: sodium chloride    dextrose 5 % and 0.45 % NaCl Last Rate: 50 mL/hr at 04/17/22 3860    Scheduled Meds:   lisinopril, 40 mg, Oral, Daily    ARIPiprazole, 10 mg, Oral, Daily    metoprolol tartrate, 25 mg, Oral, BID    sodium chloride flush, 5-40 mL, IntraVENous, 2 times per day    bisacodyl, 5 mg, Oral, Daily    acetaminophen, 650 mg, Oral, Q6H    Physical Exam:  General Appearance:   Alert, cooperative, no distress    Head:   Normocephalic, atraumatic    Lungs:    Equal chest rise, respirations unlabored   Heart:   Regular rate and rhythm    Abdomen:    Soft, mild tenderness as expected, no rebound or guarding   Incisions C/D/I   Extremities:  No cyanosis or edema    Neurologic:  Nonfocal, grossly intact        Labs/Imaging Results:   No results found for this or any previous visit (from the past 24 hour(s)). Assessment:  Raj Teixeira is a 55 y.o. female who is post-op day #2 Days Post-Op left nephrectomy with stent removal (Dr. Faustina Carvajal) and repair of recurrent incisional hernia (Dr Jose Watters).  Stable postop    Diet: as tolerated   Ambulation: OOB to chair, mobilize as able   Respiratory:  IS at bedside, encourage hourly IS and deep breathing   Ok for DC from general surgery standpoint. Follow up with Dr. Jose Watters in 1-2 weeks. No lifting >10lbs until seen in the office. Routine incision care. Abdominal binder when out of bed.  Reviewed instructions with Donell Patel     Electronically signed: Raghav Villanueva MD 4/17/2022 9:08 AM

## 2022-04-17 NOTE — DISCHARGE SUMMARY
[address] [facility] [address]     Discharge Summary   [facility] 0 1 2  Patient ID:   Reva Denver   [id number]   76 y.o.   1975     Admit date: 4/15/2022     Admission Problem/Diagnosis: Hydronephrosis with ureteropelvic junction obstruction (CODE) [N13.0]  UPJ obstruction, congenital [Q62.39] Principal Problem:    UPJ obstruction, congenital  Active Problems:    Encounter for preadmission testing  Resolved Problems:    * No resolved hospital problems. *      Admitting Physician: Raj Martini MD     Discharge Diagnoses: atrophic left kidney, ventral hernia     Condition at Discharge: good     Discharge date and time: 4/17/22 0906     Patient Instructions:     Current Discharge Medication List      CONTINUE these medications which have NOT CHANGED    Details   ARIPiprazole (ABILIFY) 10 MG tablet Take 10 mg by mouth daily      Atorvastatin Calcium (LIPITOR PO) Take by mouth daily      LISINOPRIL PO Take by mouth daily      albuterol sulfate  (90 Base) MCG/ACT inhaler Inhale 2 puffs into the lungs every 6 hours as needed for Wheezing or Shortness of Breath (or cough) Please include spacer with instructions for use. Qty: 1 Inhaler, Refills: 0      metoprolol tartrate (LOPRESSOR) 25 MG tablet Take 25 mg by mouth 2 times daily      acetaminophen (TYLENOL) 500 MG tablet Take 1,000 mg by mouth every 6 hours as needed for Pain         STOP taking these medications       ibuprofen (ADVIL) 200 MG tablet Comments:   Reason for Stopping:                Activity: no lifting, Driving, or Strenuous exercise for 4-6 weeks     Diet: regular diet     Wound Care: reinforce dressing PRN     Follow-up with  in ~ 10 days    Consults: general surgery (ventral hernia repair)    Procedures Performed & Treatment Rendered: robotic assisted left simple nephrectomy, ventral wall hernia repair 4/15/22     Summary of Hospital Stay/ Conclusions at Discharge: Reva Denver was admitted post op. AF thru course. BP elevated but at baseline. Tolerating po, +flatus. Home today. To call with any f/c, intractable pain. D/w gen surg, ok to release from their standpoint as well.       Cody Garcia MD     Electronically signed at 4/17/2022

## 2022-04-18 ENCOUNTER — TELEPHONE (OUTPATIENT)
Dept: BARIATRICS/WEIGHT MGMT | Age: 47
End: 2022-04-18

## 2022-04-18 DIAGNOSIS — G89.18 POST-OP PAIN: Primary | ICD-10-CM

## 2022-04-18 RX ORDER — HYDROCODONE BITARTRATE AND ACETAMINOPHEN 5; 325 MG/1; MG/1
1 TABLET ORAL EVERY 6 HOURS PRN
Qty: 20 TABLET | Refills: 0 | Status: SHIPPED | OUTPATIENT
Start: 2022-04-18 | End: 2022-04-21

## 2022-04-18 RX ORDER — CYCLOBENZAPRINE HCL 10 MG
10 TABLET ORAL 3 TIMES DAILY PRN
Qty: 21 TABLET | Refills: 0 | Status: SHIPPED | OUTPATIENT
Start: 2022-04-18 | End: 2022-04-28

## 2022-04-18 NOTE — TELEPHONE ENCOUNTER
4/15/22    LEFT NEPHRECTOMY LAPAROSCOPIC ROBOTIC WITH STENT REMOVAL (Left )   RECURRENT INCISIONAL HERNIA  REPAIR LAPAROSCOPIC ROBOTIC (N/A Abdomen)    Procedures     Pt states she did not get any pain meds apon d/c   pt states dr. Manan Griffin will not give her any narcotics  Pt now asking dr. Boyd Mejias for pain meds please advise

## 2022-04-19 NOTE — OP NOTE
Operative Note    Patient ID:  Natividad Rodriguez  [id number]  59 y.o.  1975      Pre-operative Diagnosis: recurrent incisional Hernia    Post-operative Diagnosis: same    Procedure: 1. Robotic/laparoscopic assisted Ventral hernia repair with mesh    2. Bilateral posterior rectus sheath release (PSR)       Surgeon: Messi Mcmahon MD    First Assistant: Kaitlin Manuel PA-C  The use of a first assistant was necessary for the proper positioning, prepping, and draping of the patient, intraoperative retraction, passing sutures and implants(like mesh), stapling bowel and vessels using  devises when necessary, and suction using laparoscopic instruments, exchanging [employer] robotic instruments, passing sutures and closure of skin and subcutaneous tissues. Findings:  same    Estimated Blood Loss:  Minimal           Total IV Fluids: 1000 ml            Complications:  None; patient tolerated the procedure well. Disposition: PACU - hemodynamically stable. Condition: stable        Indication:  Pt with recurrent incisional hernia and left renal mass      Procedure Details: The patient was seen again in the Holding Room. The risks, benefits, complications, treatment options, and expected outcomes were discussed with the patient. The possibilities of reaction to medication, pulmonary aspiration, perforation of viscus, bleeding, recurrent infection, the need for additional procedures, and development of a complication requiring transfusion or further operation were discussed with the patient and/or family. There was concurrence with the proposed plan, and informed consent was obtained. The site of surgery was properly noted/marked. The patient was taken to the Operating Room, identified as Natividad Rodriguez, and the procedure verified. A Time Out was held and the above information confirmed.       The patient was already in the operating room having undergone left nephrectomy in right lateral decubitus positioning. There were existing robotic ports in place. The robot patient cart was docked on the left side of the patient. Using robotic scissors and atraumatic graspers, adhesiolysis was performed. On general inspection, there were extensive omental and small bowel adhesion in the abdominal cavity. The adhesiolysis took additional 45  min. There was no enterotomies. The hernia defect measured 12 cm. I started by mobilizing the posterior rectus sheath(PRS)on the left side. The semilunaris and perforators were identified. Incision of PRS medial to linea semilunaris was made  to expose underlying transversus muscle. This was followed by the division of transversus muscle to expose underlying transversalis fascia. The lateral space was dissected caudal to arcuate line toward space of Retzius. This release was plenty to approximate the posterior fascial closure with minimal tension. At this point the contralateral side was also approached in similar manor to release the posterior rectus sheath from the same side. The posterior sheath was then closed with 2-0 barbed suture and the anterior rectus muscle was closed with 0 PDS barbed suture. The 9 x 16 cm prolene was unrolled and secured in similar mannor. The flap was then closed using 3-0 PDS. Then the port sites were approximated using 4-0 Vicryl in subcuticular fashion. The patient remained stable for the entire operation and was transferred to the recovery room in a stable condition. Instrument and lap counts were correct at the end of the case.        Matt Roe MD

## 2022-04-22 LAB
ABO/RH: NORMAL
ANTIBODY SCREEN: NEGATIVE
COMMENT: NORMAL
COMPONENT: NORMAL
COMPONENT: NORMAL
CROSSMATCH RESULT: NORMAL
CROSSMATCH RESULT: NORMAL
STATUS: NORMAL
STATUS: NORMAL
TRANSFUSION STATUS: NORMAL
TRANSFUSION STATUS: NORMAL
UNIT DIVISION: 0
UNIT DIVISION: 0
UNIT NUMBER: NORMAL
UNIT NUMBER: NORMAL

## 2022-04-25 ENCOUNTER — OFFICE VISIT (OUTPATIENT)
Dept: SURGERY | Age: 47
End: 2022-04-25

## 2022-04-25 VITALS — HEART RATE: 93 BPM | BODY MASS INDEX: 33.84 KG/M2 | HEIGHT: 68 IN | OXYGEN SATURATION: 97 % | WEIGHT: 223.3 LBS

## 2022-04-25 DIAGNOSIS — Z09 POSTOPERATIVE EXAMINATION: Primary | ICD-10-CM

## 2022-04-25 PROCEDURE — 99024 POSTOP FOLLOW-UP VISIT: CPT | Performed by: SURGERY

## 2022-04-25 ASSESSMENT — PATIENT HEALTH QUESTIONNAIRE - PHQ9
SUM OF ALL RESPONSES TO PHQ QUESTIONS 1-9: 0
SUM OF ALL RESPONSES TO PHQ9 QUESTIONS 1 & 2: 0
SUM OF ALL RESPONSES TO PHQ QUESTIONS 1-9: 0
2. FEELING DOWN, DEPRESSED OR HOPELESS: 0
SUM OF ALL RESPONSES TO PHQ QUESTIONS 1-9: 0
SUM OF ALL RESPONSES TO PHQ QUESTIONS 1-9: 0
1. LITTLE INTEREST OR PLEASURE IN DOING THINGS: 0

## 2022-04-25 NOTE — PROGRESS NOTES
Chief Complaint   Patient presents with    Post-Op Check     1st PO Recurren KOBE Incisional Hernia Repair @ [facility] 4/15/22/( Left KOBE LAP Nephrectomy Dr. Osmani Royal:  Patient here for post op visit. Pain is minimal.  Wounds: minbruising and no discharge. Past Surgical History:   Procedure Laterality Date    BLADDER SURGERY Left 9/17/2021    LEFT CYSTOSCOPY RETROGRADE PYELOGRAM STENT INSERTION performed by Nikole Deleon MD at [address] [place] [address], [address] [facility], [address] [facility], 2000, 2006( with BPS)    x 5    HERNIA REPAIR      abdominal    HERNIA REPAIR N/A 4/15/2022    RECURRENT INCISIONAL HERNIA  REPAIR LAPAROSCOPIC ROBOTIC performed by Cesar England MD at [address] [facility] Left 4/15/2022    LEFT NEPHRECTOMY LAPAROSCOPIC ROBOTIC WITH STENT REMOVAL performed by Blane Terrazas MD at [address] [facility]  12 29 2015    robotic assist lap ventral hernia repair with mesh graft    UMBILICAL HERNIA REPAIR  03/31/15    laparoscopic with mesh    UMBILICAL HERNIA REPAIR  01/23/2017    Robotic    VENTRAL HERNIA REPAIR  01/23/2017    Robotic    WISDOM TOOTH EXTRACTION      3 of 4 extracted     Past Medical History:   Diagnosis Date    Asthma     last flare up 11/2016    Hyperlipidemia     Hypertension     Kidney problem     left kidney functioning only 10 percent.     Migraine     Last migraine 12/2015    Ovarian cyst     Rt side    Wears glasses      Family History   Problem Relation Age of Onset    Diabetes Mother     Other Mother         thyroid    High Blood Pressure Mother     COPD Mother     Kidney Disease Mother     High Blood Pressure Sister     Emphysema Sister     High Blood Pressure Sister     Early Death Son     Hearing Loss Daughter      Social History     Socioeconomic History    Marital status: Legally      Spouse name: Not on file    Number of children: Not on file    Years of education: Not on file    Highest education level: Not on file Occupational History    Not on file   Tobacco Use    Smoking status: Current Every Day Smoker     Packs/day: 0.50     Years: 8.00     Pack years: 4.00     Types: Cigarettes    Smokeless tobacco: Never Used   Vaping Use    Vaping Use: Never used   Substance and Sexual Activity    Alcohol use: Not Currently     Comment: occasional       CAFFEINE: 2 liter soda daily    Drug use: No    Sexual activity: Yes     Partners: Male   Other Topics Concern    Not on file   Social History Narrative    Not on file     Social Determinants of Health     Financial Resource Strain:     Difficulty of Paying Living Expenses: Not on file   Food Insecurity:     Worried About Running Out of Food in the Last Year: Not on file    Catr[place]ita of Food in the Last Year: Not on file   Transportation Needs:     Lack of Transportation (Medical): Not on file    Lack of Transportation (Non-Medical): Not on file   Physical Activity:     Days of Exercise per Week: Not on file    Minutes of Exercise per Session: Not on file   Stress:     Feeling of Stress : Not on file   Social Connections:     Frequency of Communication with Friends and Family: Not on file    Frequency of Social Gatherings with Friends and Family: Not on file    Attends Quaker Services: Not on file    Active Member of [address] [place] [address] frenting or Organizations: Not on file    Attends Club or Organization Meetings: Not on file    Marital Status: Not on file   Intimate Partner Violence:     Fear of Current or Ex-Partner: Not on file    Emotionally Abused: Not on file    Physically Abused: Not on file    Sexually Abused: Not on file   Housing Stability:     Unable to Pay for Housing in the Last Year: Not on file    Number of [place] in the Last Year: Not on file    Unstable Housing in the Last Year: Not on file       OBJECTIVE:   Physical Exam    Wound well healed without signs of active infection. Suture line intact. Abdomen soft, nontender, nondistended.      Path reveals:   Final Pathologic Diagnosis:   Kidney, left, nephrectomy:   -  Benign kidney containing chronic interstitial   inflammation and focal glomerular sclerosis. -  Chronic inflammation involving the ureter is noted. -  Negative for malignancy. ASSESSMENT:  Patient doing well on this post operative check. Wounds well healed. 1. Postoperative examination        PLAN:  Continue same  Increase activity as tolerated        No orders of the defined types were placed in this encounter. No orders of the defined types were placed in this encounter. Follow Up: No follow-ups on file.     Kedar Pisano MD

## 2022-05-05 ENCOUNTER — OFFICE VISIT (OUTPATIENT)
Dept: SURGERY | Age: 47
End: 2022-05-05

## 2022-05-05 VITALS
BODY MASS INDEX: 33.74 KG/M2 | SYSTOLIC BLOOD PRESSURE: 126 MMHG | DIASTOLIC BLOOD PRESSURE: 88 MMHG | WEIGHT: 222.6 LBS | OXYGEN SATURATION: 96 % | HEART RATE: 93 BPM | HEIGHT: 68 IN

## 2022-05-05 DIAGNOSIS — Z09 POSTOPERATIVE EXAMINATION: Primary | ICD-10-CM

## 2022-05-05 PROCEDURE — 99024 POSTOP FOLLOW-UP VISIT: CPT | Performed by: SURGERY

## 2022-05-05 ASSESSMENT — PATIENT HEALTH QUESTIONNAIRE - PHQ9
2. FEELING DOWN, DEPRESSED OR HOPELESS: 0
SUM OF ALL RESPONSES TO PHQ QUESTIONS 1-9: 0
SUM OF ALL RESPONSES TO PHQ9 QUESTIONS 1 & 2: 0
1. LITTLE INTEREST OR PLEASURE IN DOING THINGS: 0
SUM OF ALL RESPONSES TO PHQ QUESTIONS 1-9: 0

## 2022-05-08 NOTE — PROGRESS NOTES
Chief Complaint   Patient presents with    Post-Op Check     2nd po ramu recurrent incision[facility] 4/15/22         SUBJECTIVE:  Patient here for post op visit  Pain is minimal.  Wounds: minbruising and no discharge. Past Surgical History:   Procedure Laterality Date    BLADDER SURGERY Left 9/17/2021    LEFT CYSTOSCOPY RETROGRADE PYELOGRAM STENT INSERTION performed by Lynne Aparicio MD at [address] [place] [address], [address], [address] [facility], 2000, 2006( with BPS)    x 5    HERNIA REPAIR      abdominal    HERNIA REPAIR N/A 4/15/2022    RECURRENT INCISIONAL HERNIA  REPAIR LAPAROSCOPIC ROBOTIC performed by Kit Lizarraga MD at [address] [facility] Left 4/15/2022    LEFT NEPHRECTOMY LAPAROSCOPIC ROBOTIC WITH STENT REMOVAL performed by Yue Rios MD at [address]  12 29 2015    robotic assist lap ventral hernia repair with mesh graft    UMBILICAL HERNIA REPAIR  03/31/15    laparoscopic with mesh    UMBILICAL HERNIA REPAIR  01/23/2017    Robotic    VENTRAL HERNIA REPAIR  01/23/2017    Robotic    WISDOM TOOTH EXTRACTION      3 of 4 extracted     Past Medical History:   Diagnosis Date    Asthma     last flare up 11/2016    Hyperlipidemia     Hypertension     Kidney problem     left kidney functioning only 10 percent.     Migraine     Last migraine 12/2015    Ovarian cyst     Rt side    Wears glasses      Family History   Problem Relation Age of Onset    Diabetes Mother     Other Mother         thyroid    High Blood Pressure Mother     COPD Mother     Kidney Disease Mother     High Blood Pressure Sister     Emphysema Sister     High Blood Pressure Sister     Early Death Son     Hearing Loss Daughter      Social History     Socioeconomic History    Marital status: Legally      Spouse name: Not on file    Number of children: Not on file    Years of education: Not on file    Highest education level: Not on file   Occupational History    Not on file   Tobacco Use    Smoking status: Current Every Day Smoker     Packs/day: 0.50     Years: 8.00     Pack years: 4.00     Types: Cigarettes    Smokeless tobacco: Never Used   Vaping Use    Vaping Use: Never used   Substance and Sexual Activity    Alcohol use: Not Currently     Comment: occasional       CAFFEINE: 2 liter soda daily    Drug use: No    Sexual activity: Yes     Partners: Male   Other Topics Concern    Not on file   Social History Narrative    Not on file     Social Determinants of Health     Financial Resource Strain:     Difficulty of Paying Living Expenses: Not on file   Food Insecurity:     Worried About Running Out of Food in the Last Year: Not on file    Catrachita of Food in the Last Year: Not on file   Transportation Needs:     Lack of Transportation (Medical): Not on file    Lack of Transportation (Non-Medical): Not on file   Physical Activity:     Days of Exercise per Week: Not on file    Minutes of Exercise per Session: Not on file   Stress:     Feeling of Stress : Not on file   Social Connections:     Frequency of Communication with Friends and Family: Not on file    Frequency of Social Gatherings with Friends and Family: Not on file    Attends Restoration Services: Not on file    Active Member of [address] [place] [address] [employer] or Organizations: Not on file    Attends Club or Organization Meetings: Not on file    Marital Status: Not on file   Intimate Partner Violence:     Fear of Current or Ex-Partner: Not on file    Emotionally Abused: Not on file    Physically Abused: Not on file    Sexually Abused: Not on file   Housing Stability:     Unable to Pay for Housing in the Last Year: Not on file    Number of [place] in the Last Year: Not on file    Unstable Housing in the Last Year: Not on file       OBJECTIVE:   Physical Exam    Wound well healed without signs of active infection. Suture line intact. Abdomen soft, nontender, nondistended.     ASSESSMENT:  Patient doing well on this post operative check. Wounds well healed. 1. Postoperative examination        PLAN:    Continue same  Increase activity as tolerated        No orders of the defined types were placed in this encounter. No orders of the defined types were placed in this encounter. Follow Up: No follow-ups on file.     Evan Awad MD

## 2022-06-06 ENCOUNTER — OFFICE VISIT (OUTPATIENT)
Dept: SURGERY | Age: 47
End: 2022-06-06

## 2022-06-06 VITALS
BODY MASS INDEX: 33.13 KG/M2 | WEIGHT: 217.9 LBS | DIASTOLIC BLOOD PRESSURE: 80 MMHG | HEART RATE: 86 BPM | SYSTOLIC BLOOD PRESSURE: 110 MMHG | OXYGEN SATURATION: 96 %

## 2022-06-06 DIAGNOSIS — Z09 POSTOPERATIVE EXAMINATION: Primary | ICD-10-CM

## 2022-06-06 PROCEDURE — 99024 POSTOP FOLLOW-UP VISIT: CPT | Performed by: SURGERY

## 2022-06-21 NOTE — PROGRESS NOTES
Chief Complaint   Patient presents with    Post-Op Check     3rd P/O Marv [facility] Inc Hernia Repair (4/15/22 [facility])         SUBJECTIVE:  Patient here for post op visit. Pain is minimal.  Wounds: minbruising and no discharge. Past Surgical History:   Procedure Laterality Date    BLADDER SURGERY Left 9/17/2021    LEFT CYSTOSCOPY RETROGRADE PYELOGRAM STENT INSERTION performed by Lucas Estrelal MD at [address] [place] [address] Blvd, [address], [address] [facility], 2000, 2006( with BPS)    x 5    HERNIA REPAIR      abdominal    HERNIA REPAIR N/A 4/15/2022    RECURRENT INCISIONAL HERNIA  REPAIR LAPAROSCOPIC ROBOTIC performed by Landon Brandon MD at [address] [facility] Left 4/15/2022    LEFT NEPHRECTOMY LAPAROSCOPIC ROBOTIC WITH STENT REMOVAL performed by Celsa Pires MD at [facility]  12 29 2015    robotic assist lap ventral hernia repair with mesh graft    UMBILICAL HERNIA REPAIR  03/31/15    laparoscopic with mesh    UMBILICAL HERNIA REPAIR  01/23/2017    Robotic    VENTRAL HERNIA REPAIR  01/23/2017    Robotic    WISDOM TOOTH EXTRACTION      3 of 4 extracted     Past Medical History:   Diagnosis Date    Asthma     last flare up 11/2016    Hyperlipidemia     Hypertension     Kidney problem     left kidney functioning only 10 percent.     Migraine     Last migraine 12/2015    Ovarian cyst     Rt side    Wears glasses      Family History   Problem Relation Age of Onset   Adams Diabetes Mother     Other Mother         thyroid    High Blood Pressure Mother     COPD Mother     Kidney Disease Mother     High Blood Pressure Sister     Emphysema Sister     High Blood Pressure Sister     Early Death Son     Hearing Loss Daughter      Social History     Socioeconomic History    Marital status: Legally      Spouse name: Not on file    Number of children: Not on file    Years of education: Not on file    Highest education level: Not on file   Occupational History    Not on file Tobacco Use    Smoking status: Current Every Day Smoker     Packs/day: 0.50     Years: 8.00     Pack years: 4.00     Types: Cigarettes    Smokeless tobacco: Never Used   Vaping Use    Vaping Use: Never used   Substance and Sexual Activity    Alcohol use: Not Currently     Comment: occasional       CAFFEINE: 2 liter soda daily    Drug use: No    Sexual activity: Yes     Partners: Male   Other Topics Concern    Not on file   Social History Narrative    Not on file     Social Determinants of Health     Financial Resource Strain:     Difficulty of Paying Living Expenses: Not on file   Food Insecurity:     Worried About Running Out of Food in the Last Year: Not on file    Catrachita of Food in the Last Year: Not on file   Transportation Needs:     Lack of Transportation (Medical): Not on file    Lack of Transportation (Non-Medical): Not on file   Physical Activity:     Days of Exercise per Week: Not on file    Minutes of Exercise per Session: Not on file   Stress:     Feeling of Stress : Not on file   Social Connections:     Frequency of Communication with Friends and Family: Not on file    Frequency of Social Gatherings with Friends and Family: Not on file    Attends Orthodox Services: Not on file    Active Member of [address] [place] [address] [employer] or Organizations: Not on file    Attends Club or Organization Meetings: Not on file    Marital Status: Not on file   Intimate Partner Violence:     Fear of Current or Ex-Partner: Not on file    Emotionally Abused: Not on file    Physically Abused: Not on file    Sexually Abused: Not on file   Housing Stability:     Unable to Pay for Housing in the Last Year: Not on file    Number of [place] in the Last Year: Not on file    Unstable Housing in the Last Year: Not on file       OBJECTIVE:   Physical Exam    Wound well healed without signs of active infection. Suture line intact. Abdomen soft, nontender, nondistended.        ASSESSMENT:  Patient doing well on this post operative check. Wounds well healed. 1. Postoperative examination        PLAN:    Continue same  Increase activity as tolerated        No orders of the defined types were placed in this encounter. No orders of the defined types were placed in this encounter. Follow Up: No follow-ups on file.     Truman Landeros MD

## 2022-09-01 LAB
A/G RATIO: 1.7 (ref 1.1–2.2)
ALBUMIN SERPL-MCNC: 4.7 G/DL (ref 3.4–5)
ALP BLD-CCNC: 171 U/L (ref 40–129)
ALT SERPL-CCNC: 12 U/L (ref 10–40)
ANION GAP SERPL CALCULATED.3IONS-SCNC: 12 MMOL/L (ref 3–16)
AST SERPL-CCNC: 15 U/L (ref 15–37)
BILIRUB SERPL-MCNC: 0.3 MG/DL (ref 0–1)
BUN BLDV-MCNC: 15 MG/DL (ref 7–20)
CALCIUM SERPL-MCNC: 11.3 MG/DL (ref 8.3–10.6)
CHLORIDE BLD-SCNC: 102 MMOL/L (ref 99–110)
CO2: 24 MMOL/L (ref 21–32)
CREAT SERPL-MCNC: 1 MG/DL (ref 0.6–1.1)
GFR AFRICAN AMERICAN: >60
GFR NON-AFRICAN AMERICAN: 59
GLUCOSE BLD-MCNC: 127 MG/DL (ref 70–99)
PARATHYROID HORMONE INTACT: 90.1 PG/ML (ref 14–72)
POTASSIUM SERPL-SCNC: 4.2 MMOL/L (ref 3.5–5.1)
SODIUM BLD-SCNC: 138 MMOL/L (ref 136–145)
T4 FREE: 1.2 NG/DL (ref 0.9–1.8)
TOTAL PROTEIN: 7.4 G/DL (ref 6.4–8.2)
TSH SERPL DL<=0.05 MIU/L-ACNC: 1.73 UIU/ML (ref 0.27–4.2)
VITAMIN D 25-HYDROXY: 44.8 NG/ML

## 2023-05-10 ENCOUNTER — HOSPITAL ENCOUNTER (OUTPATIENT)
Dept: ULTRASOUND IMAGING | Age: 48
Discharge: HOME | End: 2023-05-10
Payer: COMMERCIAL

## 2023-05-10 DIAGNOSIS — R74.8 ALKALINE PHOSPHATASE ELEVATION: ICD-10-CM

## 2023-05-10 PROCEDURE — 76705 ECHO EXAM OF ABDOMEN: CPT

## 2023-06-02 NOTE — BRIEF OP NOTE
Brief Postoperative Note      Patient: Bruce Liu  YOB: 1975  MRN: [id number]    Date of Procedure: 4/15/2022    Pre-Op Diagnosis: Hydronephrosis with ureteropelvic junction obstruction (CODE) [N13.0]    Post-Op Diagnosis: Same       Procedure(s):  LEFT NEPHRECTOMY LAPAROSCOPIC ROBOTIC WITH STENT REMOVAL  RECURRENT INCISIONAL HERNIA  REPAIR LAPAROSCOPIC ROBOTIC    Surgeon(s):  MD Celsa Alcantar MD Marilynne Sida, DO    Assistant:  First Assistant: Ashlyn Garza PA-C    Anesthesia: General    Estimated Blood Loss (mL): Minimal    Complications: None    Specimens:   ID Type Source Tests Collected by Time Destination   A : LEFT KIDNEY AND STENT Tissue Kidney SURGICAL PATHOLOGY Celsa Pires MD 4/15/2022 0920        Implants:  Implant Name Type Inv. Item Serial No.  Lot No. LRB No. Used Action   MESH SURG [id number] DIA5MM POLYPR RECT FLAT FOR SFT TISS REP - [id number]  MESH SURG [id number] DIA5MM POLYPR RECT FLAT FOR SFT TISS REP  [employer] [id number]  1 Implanted         Drains:   Urethral Catheter Non-latex 16 fr (Active)       Findings: 1. Atrophic kidney with posterior inflammation from chronic indwelling stent. Stent removed at end of case.      [id number]    Electronically signed by Celsa Pires MD on 4/15/2022 at 11:04 AM catheter removed  over the wire.

## 2023-11-14 DIAGNOSIS — R20.2 PARESTHESIA: Primary | ICD-10-CM

## 2024-01-29 ENCOUNTER — PROCEDURE VISIT (OUTPATIENT)
Dept: NEUROLOGY | Age: 49
End: 2024-01-29
Payer: COMMERCIAL

## 2024-01-29 DIAGNOSIS — G56.03 CARPAL TUNNEL SYNDROME, BILATERAL UPPER LIMBS: Primary | ICD-10-CM

## 2024-01-29 PROCEDURE — 95911 NRV CNDJ TEST 9-10 STUDIES: CPT | Performed by: PHYSICAL MEDICINE & REHABILITATION

## 2024-01-29 PROCEDURE — 95886 MUSC TEST DONE W/N TEST COMP: CPT | Performed by: PHYSICAL MEDICINE & REHABILITATION

## 2024-01-29 NOTE — PROGRESS NOTES
EMG    Risks and benefits of study discussed.    Specific and common risks of pain and bleeding as well as uncommon side effects of infection, hematoma and vasovagal episodes    Patient agreeable to testing and consents to such.    Clinical: 3 to 4 months of left hand numbness which is fairly persistent.  Previous episodes of left hand numbness which would come and go.    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  - Left side electrodiagnostic changes are somewhat worse than the right.  #2.  No evidence of cervical radiculopathy, brachial plexopathy or generalized peripheral neuropathy affecting the symptomatic left upper limb.

## 2024-12-02 NOTE — ED PROVIDER NOTES
EMERGENCY DEPARTMENT ENCOUNTER      PCP: YOLANDA Molina - NP    [address] [facility]    Chief Complaint   Patient presents with    Flank Pain     left flank pain for 3 days    Emesis       This patient was not evaluated by the attending physician. I have independently evaluated this patient. HPI    Carline Harman is a 55 y.o. female who presents with left flank pain. Onset 2 days ago. Patient describes pain as stabbing. Patient has had associated nausea and vomiting. Patient states she has history of kidney problem in the left previously, has not followed up with specialist since April. No known aggravating or alleviating factors. Patient states pain is coming on for months, worse in the past 2 days. Patient denies pain with urination, blood in urine. Patient denies chest pain, shortness of breath, fever. Patient denies diarrhea. REVIEW OF SYSTEMS    Constitutional:  Denies fever  HENT:  Denies sore throat or ear pain   Cardiovascular:  Denies chest pain   Respiratory:  Denies cough or shortness of breath   GI:  See HPI above  : No hematuria or dysuria. Musculoskeletal: See HPI no pain or swelling of extremities.   Skin:  Denies rash  Neurologic:  Denies focal weakness or sensory changes   Lymphatic:  Denies swollen glands     All other review of systems are negative  See HPI and nursing notes for additional information     PAST MEDICAL & SURGICAL HISTORY    Past Medical History:   Diagnosis Date    Asthma     last flare up 11/2016    Migraine     Last migraine 12/2015    Ovarian cyst     Rt side    Wears glasses      Past Surgical History:   Procedure Laterality Date   Slipager 71, 12, 1996, 2000, 2006( with BPS)    x 5    HERNIA REPAIR      OTHER SURGICAL HISTORY  12 29 2015    robotic assist lap ventral hernia repair with mesh graft    UMBILICAL HERNIA REPAIR  03/31/15    laparoscopic with mesh    UMBILICAL HERNIA REPAIR  01/23/2017    Robotic    VENTRAL HERNIA REPAIR  01/23/2017    Robotic    WISDOM TOOTH EXTRACTION      3 of 4 extracted       CURRENT MEDICATIONS    Current Outpatient Rx   Medication Sig Dispense Refill    albuterol sulfate  (90 Base) MCG/ACT inhaler Inhale 2 puffs into the lungs every 6 hours as needed for Wheezing or Shortness of Breath (or cough) Please include spacer with instructions for use. 1 Inhaler 0    naproxen (NAPROSYN) 500 MG tablet Take 1 tablet by mouth 2 times daily 15 tablet 0    ondansetron (ZOFRAN ODT) 4 MG disintegrating tablet Take 1 tablet by mouth every 8 hours as needed for Nausea or Vomiting 15 tablet 0    metoprolol tartrate (LOPRESSOR) 25 MG tablet Take 25 mg by mouth 2 times daily      acetaminophen (TYLENOL) 500 MG tablet Take 1,000 mg by mouth every 6 hours as needed for Pain      Acetaminophen-Caff-Pyrilamine (MIDOL COMPLETE PO) Take 2 tablets by mouth 2 times daily as needed          ALLERGIES    Allergies   Allergen Reactions    Ciprofloxacin Hives       SOCIAL AND FAMILY HISTORY    Social History     Socioeconomic History    Marital status: Legally      Spouse name: None    Number of children: None    Years of education: None    Highest education level: None   Occupational History    None   Tobacco Use    Smoking status: Current Some Day Smoker     Packs/day: 0.50     Years: 8.00     Pack years: 4.00     Types: Cigarettes    Smokeless tobacco: Never Used   Substance and Sexual Activity    Alcohol use: Yes     Comment: occasional       CAFFEINE: 2 liter soda daily    Drug use: No    Sexual activity: Yes     Partners: Male   Other Topics Concern    None   Social History Narrative    None     Social Determinants of Health     Financial Resource Strain:     Difficulty of Paying Living Expenses:    Food Insecurity:     Worried About Running Out of Food in the Last Year:     Ran Out of Food in the Last Year:    Transportation Needs:     Lack of Transportation (Medical):      Lack of Transportation (Non-Medical):    Physical Activity:     Days of Exercise per Week:     Minutes of Exercise per Session:    Stress:     Feeling of Stress :    Social Connections:     Frequency of Communication with Friends and Family:     Frequency of Social Gatherings with Friends and Family:     Attends [employer] Services:     Active Member of Clubs or Organizations:     Attends Club or Organization Meetings:     Marital Status:    Intimate Partner Violence:     Fear of Current or Ex-Partner:     Emotionally Abused:     Physically Abused:     Sexually Abused:      Family History   Problem Relation Age of Onset    Diabetes Mother     Other Mother         thyroid    High Blood Pressure Mother     COPD Mother     Kidney Disease Mother     High Blood Pressure Sister     Emphysema Sister     High Blood Pressure Sister     Early Death Son     Hearing Loss Daughter        PHYSICAL EXAM    VITAL SIGNS: BP (!) 172/124   Pulse 98   Temp 98.3 °F (36.8 °C) (Oral)   Resp 15   Ht 5' 9\" (1.753 m)   Wt 237 lb (107.5 kg)   LMP 07/25/2021   SpO2 96%   BMI 35.00 kg/m²   Constitutional:  Well developed, well nourished  Eyes:  Sclera nonicteric, conjunctiva moist  HENT:  Atraumatic. PERRL. EOMI.  moist mucus membranes. Neck/Lymphatics: supple, no JVD, no swollen nodes  Respiratory:  No retractions, no accessory muscle use, normal breath sounds   Cardiovascular: normal rate, normal rhythm  GI:     No gross discoloration.       -no Aman's sign (periumbilical ecchymosis)       -no Grey-Vogt's sign (flank ecchymosis)    Bowel sounds present, no audible bruits. Soft,  no guarding   no abdominal tenderness, no rebound  Back: Left sided CVA tenderness to percussion.   Musculoskeletal:  No edema, no deformity  Integument: No rash, dry skin  Neurologic:  Alert & oriented, normal speech  Psychiatric: Cooperative, pleasant affect       LABS:  Results for orders placed or performed during the hospital encounter of 08/20/21   CBC auto diff   Result Value Ref Range    WBC 10.1 4.0 - 10.5 K/CU MM    RBC 4.57 4.2 - 5.4 M/CU MM    Hemoglobin 14.4 12.5 - 16.0 GM/DL    Hematocrit 43.5 37 - 47 %    MCV 95.2 78 - 100 FL    MCH 31.5 (H) 27 - 31 PG    MCHC 33.1 32.0 - 36.0 %    RDW 12.9 11.7 - 14.9 %    Platelets 647 727 - 996 K/CU MM    MPV 10.3 7.5 - 11.1 FL    Differential Type AUTOMATED DIFFERENTIAL     Segs Relative 69.4 (H) 36 - 66 %    Lymphocytes % 23.1 (L) 24 - 44 %    Monocytes % 5.8 (H) 0 - 4 %    Eosinophils % 0.8 0 - 3 %    Basophils % 0.6 0 - 1 %    Segs Absolute 7.0 K/CU MM    Lymphocytes Absolute 2.3 K/CU MM    Monocytes Absolute 0.6 K/CU MM    Eosinophils Absolute 0.1 K/CU MM    Basophils Absolute 0.1 K/CU MM    Nucleated RBC % 0.0 %    Total Nucleated RBC 0.0 K/CU MM    Total Immature Neutrophil 0.03 K/CU MM    Immature Neutrophil % 0.3 0 - 0.43 %   CMP   Result Value Ref Range    Sodium 135 135 - 145 MMOL/L    Potassium 3.6 3.5 - 5.1 MMOL/L    Chloride 98 (L) 99 - 110 mMol/L    CO2 24 21 - 32 MMOL/L    BUN 12 6 - 23 MG/DL    CREATININE 1.1 0.6 - 1.1 MG/DL    Glucose 151 (H) 70 - 99 MG/DL    Calcium 10.5 8.3 - 10.6 MG/DL    Albumin 4.5 3.4 - 5.0 GM/DL    Total Protein 8.1 6.4 - 8.2 GM/DL    Total Bilirubin 0.4 0.0 - 1.0 MG/DL    ALT 15 10 - 40 U/L    AST 20 15 - 37 IU/L    Alkaline Phosphatase 147 (H) 40 - 129 IU/L    GFR Non- 53 (L) >60 mL/min/1.73m2    GFR African American >60 >60 mL/min/1.73m2    Anion Gap 13 4 - 16   Urinalysis   Result Value Ref Range    Color, UA YELLOW YELLOW    Clarity, UA HAZY (A) CLEAR    Glucose, Urine NEGATIVE NEGATIVE MG/DL    Bilirubin Urine NEGATIVE NEGATIVE MG/DL    Ketones, Urine NEGATIVE NEGATIVE MG/DL    Specific Gravity, UA 1.023 1.001 - 1.035    Blood, Urine NEGATIVE NEGATIVE    pH, Urine 5.0 5.0 - 8.0    Protein, UA NEGATIVE NEGATIVE MG/DL    Urobilinogen, Urine NEGATIVE 0.2 - 1.0 MG/DL    Nitrite Urine, Quantitative NEGATIVE NEGATIVE    Leukocyte Esterase, Urine NEGATIVE NEGATIVE    RBC, UA <1 0 - 6 /HPF    WBC, UA 2 0 - 5 /HPF    Bacteria, UA NEGATIVE NEGATIVE /HPF    Squam Epithel, UA 12 /HPF    Trichomonas, UA NONE SEEN NONE SEEN /HPF   HCG Serum, Qualitative   Result Value Ref Range    hCG Qual NEGATIVE    Lipase   Result Value Ref Range    Lipase 39 13 - 60 IU/L   Troponin   Result Value Ref Range    Troponin T <0.010 <0.01 NG/ML   EKG 12 Lead   Result Value Ref Range    Ventricular Rate 76 BPM    Atrial Rate 76 BPM    P-R Interval 188 ms    QRS Duration 98 ms    Q-T Interval 390 ms    QTc Calculation (Bazett) 438 ms    P Axis 39 degrees    R Axis 9 degrees    T Axis 65 degrees    Diagnosis       Normal sinus rhythm  Anterior infarct , age undetermined  Abnormal ECG  When compared with ECG of 12-FEB-2021 13:44,  Anterior infarct is now present         EKG Interpretation  Please see ED physician's note for EKG interpretation          RADIOLOGY/PROCEDURES    CT ABDOMEN PELVIS W IV CONTRAST Additional Contrast? 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 ED COURSE & MEDICAL DECISION MAKING      Patient presents as above. Patient provided IV fluids, Zofran and Norco.  Urinalysis unremarkable. CBC within normal limits. CMP grossly within the limits. Pregnancy negative. Troponin negative. Lipase 39. CT abdomen pelvis with IV contrast shows a chronic high-grade left ureteropelvic junction obstruction, grossly stable. I discussed labs and imaging with patient today. Patient states pain and nausea are much improved. I consulted urologist physician assistant Isa Parra who recommends outpatient follow-up with urologist Dr. Aubrey Go to continue with evaluation and to hydronephrosis. Patient provided prescription for naproxen for pain, Zofran. Recommend follow-up with urology by calling to schedule appointment for evaluation. Clinical  IMPRESSION    1. Left flank pain    2. Hydronephrosis, unspecified hydronephrosis type    3. Non-intractable vomiting with nausea, unspecified vomiting type        I discussed with patient the importance of returning the emergency department immediately if new or worsening symptoms develop. Comment: Please note this report has been produced using speech recognition software and may contain errors related to that system including errors in grammar, punctuation, and spelling, as well as words and phrases that may be inappropriate. If there are any questions or concerns please feel free to contact the dictating provider for clarification.       Rebecca Reza PA-C  08/20/21 1005 Yes

## 2025-06-03 ENCOUNTER — TELEPHONE (OUTPATIENT)
Dept: PHYSICAL MEDICINE AND REHAB | Age: 50
End: 2025-06-03

## 2025-08-26 ENCOUNTER — PROCEDURE VISIT (OUTPATIENT)
Dept: PHYSICAL MEDICINE AND REHAB | Age: 50
End: 2025-08-26

## 2025-08-26 DIAGNOSIS — M79.602 PARESTHESIA AND PAIN OF BOTH UPPER EXTREMITIES: ICD-10-CM

## 2025-08-26 DIAGNOSIS — M79.601 PARESTHESIA AND PAIN OF BOTH UPPER EXTREMITIES: ICD-10-CM

## 2025-08-26 DIAGNOSIS — R20.2 PARESTHESIA AND PAIN OF BOTH UPPER EXTREMITIES: ICD-10-CM

## 2025-08-26 DIAGNOSIS — G56.03 BILATERAL CARPAL TUNNEL SYNDROME: Primary | ICD-10-CM

## (undated) DEVICE — TUBING, SUCTION, 9/32" X 10', STRAIGHT: Brand: MEDLINE

## (undated) DEVICE — GUIDEWIRE ENDOSCP L150CM DIA0.035IN TIP 3CM PTFE NIT

## (undated) DEVICE — GLOVE SURG SZ 8 L12IN THK75MIL DK GRN LTX FREE

## (undated) DEVICE — CLIP INT L POLYMER LOK LIG HEM O LOK

## (undated) DEVICE — APPLIER CLP L33CM SHFT DIA11MM 8 SUT ABSRB RELD REUSE LAPRA

## (undated) DEVICE — SURGICEL ENDOSCP APPL

## (undated) DEVICE — SYRINGE 20ML LL S/C 50

## (undated) DEVICE — STERILE POLYISOPRENE POWDER-FREE SURGICAL GLOVES: Brand: PROTEXIS

## (undated) DEVICE — GOWN,SIRUS,POLYRNF,BRTHSLV,XLN/XL,20/CS: Brand: MEDLINE

## (undated) DEVICE — TROCAR: Brand: KII FIOS FIRST ENTRY

## (undated) DEVICE — GLOVE SURG SZ 6 THK91MIL LTX FREE SYN POLYISOPRENE ANTI

## (undated) DEVICE — SUTURE VCRL SZ 3-0 L27IN ABSRB UD L36MM CT-1 1/2 CIR J258H

## (undated) DEVICE — GLOVE ORANGE PI 7   MSG9070

## (undated) DEVICE — SYRINGE MED BLNT 18 GAX15 IN 10 CC W/ NDL FILL LUERLOCK TIP

## (undated) DEVICE — FIRST ENTRY KIOS THRD 5X100MM ST

## (undated) DEVICE — OPEN-END FLEXI-TIP URETERAL CATHETER: Brand: FLEXI-TIP

## (undated) DEVICE — SET TBNG DISP TIP FOR AHTO

## (undated) DEVICE — SUTURE 1 STRATAFIX SYMMETRIC PDS + 30CM CT-1 SXPP1A435

## (undated) DEVICE — Device

## (undated) DEVICE — LAP CHOLE PK

## (undated) DEVICE — NEEDLE HYPO 20GA L1.5IN YEL POLYPR HUB S STL REG BVL STR

## (undated) DEVICE — MARKER SURG SKIN UTIL REGULAR/FINE 2 TIP W/ RUL AND 9 LBL

## (undated) DEVICE — SUTURE STRATAFIX SYMMETRIC SZ 1 L18IN ABSRB VLT CT1 L36CM SXPP1A404

## (undated) DEVICE — REDUCER: Brand: ENDOWRIST

## (undated) DEVICE — ARM DRAPE

## (undated) DEVICE — CANNULA SEAL

## (undated) DEVICE — GLOVE ORANGE PI 7 1/2   MSG9075

## (undated) DEVICE — AGENT HEMSTAT 3GM OXIDIZED REGENERATED CELOS ABSRB FOR CONT (ORDER MULTIPLES OF 5EA)

## (undated) DEVICE — Z DUP USE 2565107 PACK SURG PROC LEG CYSTO T-DRAPE REINF TBL CVR HND TWL

## (undated) DEVICE — SUTURE COAT VCRL SZ 4-0 L18IN ABSRB UD L19MM PS-2 1/2 CIR J496G

## (undated) DEVICE — SHEET,DRAPE,53X77,STERILE: Brand: MEDLINE

## (undated) DEVICE — SET IRRIG L94IN ID0.281IN W/ 4.5IN DST FLX CONN 2 LD ON OFF

## (undated) DEVICE — SUTURE NONABSORBABLE MONOFILAMENT 4-0 RB-1 36 IN BLU PROLENE 8557H

## (undated) DEVICE — PENCIL ES CRD L10FT HND SWCHING ROCK SWCH W/ EDGE COAT BLDE

## (undated) DEVICE — STAPLER INT L340MM 45MM STD 12 FIRING B FRM PWR + GRIPPING

## (undated) DEVICE — GOWN,ECLIPSE,POLYRNF,BRTHSLV,L,30/CS: Brand: MEDLINE

## (undated) DEVICE — APPLICATOR MEDICATED 26 CC SOLUTION HI LT ORNG CHLORAPREP

## (undated) DEVICE — INTENDED FOR TISSUE SEPARATION, AND OTHER PROCEDURES THAT REQUIRE A SHARP SURGICAL BLADE TO PUNCTURE OR CUT.: Brand: BARD-PARKER ® STAINLESS STEEL BLADES

## (undated) DEVICE — TIP COVER ACCESSORY

## (undated) DEVICE — COLUMN DRAPE

## (undated) DEVICE — RELOAD STPL L45MM H1-2.6MM MESENTERY THN TISS WHT GRIPPING

## (undated) DEVICE — TOWEL,OR,DSP,ST,BLUE,STD,6/PK,12PK/CS: Brand: MEDLINE

## (undated) DEVICE — EXCEL 10FT (3.05 M) INSUFFLATION TUBING SET WITH 0.1 MICRON FILTER: Brand: EXCEL

## (undated) DEVICE — APPLIER SUT CLP FOR 2-0 3-0 4-0 VCRL ABSRB SUT

## (undated) DEVICE — SEAL

## (undated) DEVICE — SUTURE VCRL SZ 1 L27IN ABSRB VLT L26MM CT-2 1/2 CIR J335H

## (undated) DEVICE — GLOVE SURG SZ 65 CRM LTX FREE POLYISOPRENE POLYMER BEAD ANTI

## (undated) DEVICE — 3M™ IOBAN™ 2 ANTIMICROBIAL INCISE DRAPE 6650EZ: Brand: IOBAN™ 2

## (undated) DEVICE — BLADELESS OBTURATOR, LONG: Brand: WECK VISTA

## (undated) DEVICE — SYRINGE MED 20ML STD CLR PLAS LUERLOCK TIP N CTRL DISP

## (undated) DEVICE — SUTURE SZ 0 27IN 5/8 CIR UR-6  TAPER PT VIOLET ABSRB VICRYL J603H

## (undated) DEVICE — ELECTRODE ES AD CRDLSS PT RET REM POLYHESIVE

## (undated) DEVICE — SUTURE STRATAFIX SPRL SZ 2-0 L9IN ABSRB VLT MH L36MM 1/2 SXPD2B408

## (undated) DEVICE — SUTURE VCRL SZ 0 L27IN ABSRB UD L26MM CT-2 1/2 CIR J270H

## (undated) DEVICE — TOTAL TRAY, DB, 100% SILI FOLEY, 16FR 10: Brand: MEDLINE

## (undated) DEVICE — OBTURATOR ROBOTIC DIA8MM BLDELSS ENDOSCP DISP DA VINCI SI

## (undated) DEVICE — UROLOGIC DRAIN BAG: Brand: UNBRANDED

## (undated) DEVICE — POUCH SPEC RETRV SHFT 15MM 13X23CM GRN POLYUR DBL RNG HNDL

## (undated) DEVICE — TUBING FLTR PLUME AWAY EVAC W/ SUCT DEV DISP PUREVIEW

## (undated) DEVICE — BLADELESS OBTURATOR: Brand: WECK VISTA

## (undated) DEVICE — TISSUE RETRIEVAL SYSTEM: Brand: INZII RETRIEVAL SYSTEM

## (undated) DEVICE — SUTURE VCRL SZ 4-0 L27IN ABSRB UD L19MM FS-2 3/8 CIR REV J422H